# Patient Record
Sex: FEMALE | Race: WHITE | NOT HISPANIC OR LATINO
[De-identification: names, ages, dates, MRNs, and addresses within clinical notes are randomized per-mention and may not be internally consistent; named-entity substitution may affect disease eponyms.]

---

## 2017-01-25 ENCOUNTER — RESULT REVIEW (OUTPATIENT)
Age: 22
End: 2017-01-25

## 2017-03-01 ENCOUNTER — APPOINTMENT (OUTPATIENT)
Dept: ORTHOPEDIC SURGERY | Facility: CLINIC | Age: 22
End: 2017-03-01

## 2017-03-22 ENCOUNTER — APPOINTMENT (OUTPATIENT)
Dept: ORTHOPEDIC SURGERY | Facility: CLINIC | Age: 22
End: 2017-03-22

## 2018-03-15 ENCOUNTER — RESULT REVIEW (OUTPATIENT)
Age: 23
End: 2018-03-15

## 2018-06-17 ENCOUNTER — TRANSCRIPTION ENCOUNTER (OUTPATIENT)
Age: 23
End: 2018-06-17

## 2018-06-17 ENCOUNTER — INPATIENT (INPATIENT)
Facility: HOSPITAL | Age: 23
LOS: 0 days | Discharge: HOME | DRG: 342 | End: 2018-06-18
Attending: SURGERY | Admitting: SURGERY
Payer: COMMERCIAL

## 2018-06-17 VITALS
HEIGHT: 64 IN | WEIGHT: 240.08 LBS | RESPIRATION RATE: 18 BRPM | DIASTOLIC BLOOD PRESSURE: 84 MMHG | TEMPERATURE: 100 F | OXYGEN SATURATION: 100 % | HEART RATE: 99 BPM | SYSTOLIC BLOOD PRESSURE: 139 MMHG

## 2018-06-17 DIAGNOSIS — Z98.890 OTHER SPECIFIED POSTPROCEDURAL STATES: Chronic | ICD-10-CM

## 2018-06-17 LAB
APPEARANCE UR: CLEAR — SIGNIFICANT CHANGE UP
BACTERIA # UR AUTO: ABNORMAL /HPF
BASOPHILS # BLD AUTO: 0.1 K/UL — SIGNIFICANT CHANGE UP (ref 0–0.2)
BASOPHILS NFR BLD AUTO: 0.3 % — SIGNIFICANT CHANGE UP (ref 0–2)
BILIRUB UR-MCNC: NEGATIVE — SIGNIFICANT CHANGE UP
COLOR SPEC: COLORLESS — SIGNIFICANT CHANGE UP
DIFF PNL FLD: NEGATIVE — SIGNIFICANT CHANGE UP
EOSINOPHIL # BLD AUTO: 0.2 K/UL — SIGNIFICANT CHANGE UP (ref 0–0.5)
EOSINOPHIL NFR BLD AUTO: 0.9 % — SIGNIFICANT CHANGE UP (ref 0–6)
EPI CELLS # UR: SIGNIFICANT CHANGE UP /HPF
GLUCOSE UR QL: NEGATIVE — SIGNIFICANT CHANGE UP
HCT VFR BLD CALC: 45.4 % — HIGH (ref 34.5–45)
HGB BLD-MCNC: 14.6 G/DL — SIGNIFICANT CHANGE UP (ref 11.5–15.5)
KETONES UR-MCNC: NEGATIVE — SIGNIFICANT CHANGE UP
LEUKOCYTE ESTERASE UR-ACNC: ABNORMAL
LYMPHOCYTES # BLD AUTO: 15.4 % — SIGNIFICANT CHANGE UP (ref 13–44)
LYMPHOCYTES # BLD AUTO: 2.6 K/UL — SIGNIFICANT CHANGE UP (ref 1–3.3)
MCHC RBC-ENTMCNC: 26.1 PG — LOW (ref 27–34)
MCHC RBC-ENTMCNC: 32.1 GM/DL — SIGNIFICANT CHANGE UP (ref 32–36)
MCV RBC AUTO: 81.2 FL — SIGNIFICANT CHANGE UP (ref 80–100)
MONOCYTES # BLD AUTO: 0.9 K/UL — SIGNIFICANT CHANGE UP (ref 0–0.9)
MONOCYTES NFR BLD AUTO: 5.5 % — SIGNIFICANT CHANGE UP (ref 2–14)
NEUTROPHILS # BLD AUTO: 13.3 K/UL — HIGH (ref 1.8–7.4)
NEUTROPHILS NFR BLD AUTO: 77.8 % — HIGH (ref 43–77)
NITRITE UR-MCNC: NEGATIVE — SIGNIFICANT CHANGE UP
PH UR: 7 — SIGNIFICANT CHANGE UP (ref 5–8)
PLATELET # BLD AUTO: 362 K/UL — SIGNIFICANT CHANGE UP (ref 150–400)
PROT UR-MCNC: NEGATIVE — SIGNIFICANT CHANGE UP
RBC # BLD: 5.58 M/UL — HIGH (ref 3.8–5.2)
RBC # FLD: 12.1 % — SIGNIFICANT CHANGE UP (ref 10.3–14.5)
RBC CASTS # UR COMP ASSIST: SIGNIFICANT CHANGE UP /HPF (ref 0–2)
SP GR SPEC: <1.005 — LOW (ref 1.01–1.02)
UROBILINOGEN FLD QL: NEGATIVE — SIGNIFICANT CHANGE UP
WBC # BLD: 17 K/UL — HIGH (ref 3.8–10.5)
WBC # FLD AUTO: 17 K/UL — HIGH (ref 3.8–10.5)
WBC UR QL: SIGNIFICANT CHANGE UP /HPF (ref 0–5)

## 2018-06-17 PROCEDURE — 99285 EMERGENCY DEPT VISIT HI MDM: CPT | Mod: 25

## 2018-06-17 RX ORDER — SODIUM CHLORIDE 9 MG/ML
1000 INJECTION INTRAMUSCULAR; INTRAVENOUS; SUBCUTANEOUS ONCE
Qty: 0 | Refills: 0 | Status: COMPLETED | OUTPATIENT
Start: 2018-06-17 | End: 2018-06-17

## 2018-06-17 RX ORDER — ACETAMINOPHEN 500 MG
1000 TABLET ORAL ONCE
Qty: 0 | Refills: 0 | Status: COMPLETED | OUTPATIENT
Start: 2018-06-17 | End: 2018-06-17

## 2018-06-17 RX ORDER — ONDANSETRON 8 MG/1
4 TABLET, FILM COATED ORAL ONCE
Qty: 0 | Refills: 0 | Status: COMPLETED | OUTPATIENT
Start: 2018-06-17 | End: 2018-06-17

## 2018-06-17 RX ADMIN — ONDANSETRON 4 MILLIGRAM(S): 8 TABLET, FILM COATED ORAL at 23:19

## 2018-06-17 RX ADMIN — Medication 400 MILLIGRAM(S): at 23:19

## 2018-06-17 RX ADMIN — SODIUM CHLORIDE 2000 MILLILITER(S): 9 INJECTION INTRAMUSCULAR; INTRAVENOUS; SUBCUTANEOUS at 23:19

## 2018-06-17 NOTE — ED PROVIDER NOTE - ATTENDING CONTRIBUTION TO CARE
Nani Yap MD - Attending Physician: I have personally seen and examined this patient with the resident/fellow.  I have fully participated in the care of this patient. I have reviewed all pertinent clinical information, including history, physical exam, plan and the Resident/Fellow’s note and agree except as noted. See MDM

## 2018-06-17 NOTE — ED PROVIDER NOTE - PHYSICAL EXAMINATION
Soham: A & O x 3, NAD, HEENT WNL and no facial asymmetry; lungs CTAB, heart with reg rhythm; abdomen soft with diffuse abdominal pain worst in the right lower quadrant, pelvic exam with Vivek Quach (ed tech) with minimal pain, and physiologic discharge; extremities with no edema; skin with no rashes, neuro exam non focal with no motor or sensory deficits

## 2018-06-17 NOTE — ED PROVIDER NOTE - SHIFT CHANGE DETAILS
***ATTENDING ADDENDUM (Dr. Scot Garnica): I have received handoff from [facility] [place]; followup all ED diagnostics as ordered, and perform serial reassesments. Will continue to observe and monitor closely.

## 2018-06-17 NOTE — ED PROVIDER NOTE - MEDICAL DECISION MAKING DETAILS
Nani Yap MD - Attending Physician: Pt here with 1 day of abdominal pain. BLQ but tender in RLQ, mild diarrhea yesterday but now with nausea, no vomiting. Likely enteritis but given symptoms and location will check labs, US ovaries r/o torsion. D/w patient low possibility of appy given symptoms but tenderness concerning. Pt would like to avoid CT if possible. If pain not improved, leukocytosis, no ovarian pathology will check CT abd for r/o appy

## 2018-06-17 NOTE — ED PROVIDER NOTE - PROGRESS NOTE DETAILS
**ATTENDING ADDENDUM (Dr. Scot Garnica): patient serially evaluated throughout ED course by ED team since handoff. US as ordered by Fracisco non-diagnostic. Given findings on UA and US, agree with CT abdomen/pelvis to exclude acute appendicitis or equivalent pathology. NO evidence of torsion or cyst at this time based on findings of ED diagnostics. Endorsed to LARRY Quiles at time of handoff. ED team Will continue to observe and monitor closely. Soham PGY3: remains pain controlled at this time. awaiting ct results. Soham PGY3: ct showing early appendicitis. antibiotics ordered (pcn allergy hives) and surgery consulted. pt is npo. Attending Chloe Quiles: I received sign out. CT shows evidence of appendicitis, surgery consulted

## 2018-06-17 NOTE — ED PROVIDER NOTE - OBJECTIVE STATEMENT
22 year old, with lower abdominal pain worse on right, for 1 day. 3x diarrhea yesterday. new radiation to right flank. no dysuria. Subjective fever last night. feels well today. no recent travel, no sick contacts. LMP April and this is normal because of her daily OCP. Positive cramping in abdomen but does not feel like pelvic cramping.     PMD: none 22 year old, with lower abdominal pain worse on right, for 1 day. 3x diarrhea yesterday. new radiation to right flank. no dysuria. Subjective fever last night. feels well today. no recent travel, no sick contacts. LMP April and this is normal because of her daily OCP. Positive cramping in abdomen but does not feel like pelvic cramping.   PMD: none

## 2018-06-18 ENCOUNTER — RESULT REVIEW (OUTPATIENT)
Age: 23
End: 2018-06-18

## 2018-06-18 VITALS
OXYGEN SATURATION: 99 % | HEART RATE: 96 BPM | TEMPERATURE: 98 F | DIASTOLIC BLOOD PRESSURE: 60 MMHG | SYSTOLIC BLOOD PRESSURE: 122 MMHG | RESPIRATION RATE: 18 BRPM

## 2018-06-18 DIAGNOSIS — K37 UNSPECIFIED APPENDICITIS: ICD-10-CM

## 2018-06-18 LAB
ALBUMIN SERPL ELPH-MCNC: 4.5 G/DL — SIGNIFICANT CHANGE UP (ref 3.3–5)
ALP SERPL-CCNC: 132 U/L — HIGH (ref 40–120)
ALT FLD-CCNC: 16 U/L — SIGNIFICANT CHANGE UP (ref 10–45)
ANION GAP SERPL CALC-SCNC: 16 MMOL/L — SIGNIFICANT CHANGE UP (ref 5–17)
APTT BLD: 33.1 SEC — SIGNIFICANT CHANGE UP (ref 27.5–37.4)
AST SERPL-CCNC: 11 U/L — SIGNIFICANT CHANGE UP (ref 10–40)
BILIRUB SERPL-MCNC: 0.2 MG/DL — SIGNIFICANT CHANGE UP (ref 0.2–1.2)
BLD GP AB SCN SERPL QL: NEGATIVE — SIGNIFICANT CHANGE UP
BUN SERPL-MCNC: 6 MG/DL — LOW (ref 7–23)
C TRACH RRNA SPEC QL NAA+PROBE: SIGNIFICANT CHANGE UP
CALCIUM SERPL-MCNC: 9.5 MG/DL — SIGNIFICANT CHANGE UP (ref 8.4–10.5)
CHLORIDE SERPL-SCNC: 104 MMOL/L — SIGNIFICANT CHANGE UP (ref 96–108)
CO2 SERPL-SCNC: 22 MMOL/L — SIGNIFICANT CHANGE UP (ref 22–31)
CREAT SERPL-MCNC: 0.8 MG/DL — SIGNIFICANT CHANGE UP (ref 0.5–1.3)
GLUCOSE SERPL-MCNC: 92 MG/DL — SIGNIFICANT CHANGE UP (ref 70–99)
INR BLD: 1.02 RATIO — SIGNIFICANT CHANGE UP (ref 0.88–1.16)
LIDOCAIN IGE QN: 17 U/L — SIGNIFICANT CHANGE UP (ref 7–60)
N GONORRHOEA RRNA SPEC QL NAA+PROBE: SIGNIFICANT CHANGE UP
POTASSIUM SERPL-MCNC: 3.9 MMOL/L — SIGNIFICANT CHANGE UP (ref 3.5–5.3)
POTASSIUM SERPL-SCNC: 3.9 MMOL/L — SIGNIFICANT CHANGE UP (ref 3.5–5.3)
PROT SERPL-MCNC: 7.8 G/DL — SIGNIFICANT CHANGE UP (ref 6–8.3)
PROTHROM AB SERPL-ACNC: 11 SEC — SIGNIFICANT CHANGE UP (ref 9.8–12.7)
RH IG SCN BLD-IMP: POSITIVE — SIGNIFICANT CHANGE UP
SODIUM SERPL-SCNC: 142 MMOL/L — SIGNIFICANT CHANGE UP (ref 135–145)
SPECIMEN SOURCE: SIGNIFICANT CHANGE UP

## 2018-06-18 PROCEDURE — 96374 THER/PROPH/DIAG INJ IV PUSH: CPT | Mod: XU

## 2018-06-18 PROCEDURE — 76830 TRANSVAGINAL US NON-OB: CPT

## 2018-06-18 PROCEDURE — 87591 N.GONORRHOEAE DNA AMP PROB: CPT

## 2018-06-18 PROCEDURE — 85027 COMPLETE CBC AUTOMATED: CPT

## 2018-06-18 PROCEDURE — 86850 RBC ANTIBODY SCREEN: CPT

## 2018-06-18 PROCEDURE — 83690 ASSAY OF LIPASE: CPT

## 2018-06-18 PROCEDURE — 88304 TISSUE EXAM BY PATHOLOGIST: CPT | Mod: 26

## 2018-06-18 PROCEDURE — 88304 TISSUE EXAM BY PATHOLOGIST: CPT

## 2018-06-18 PROCEDURE — 93975 VASCULAR STUDY: CPT | Mod: 26

## 2018-06-18 PROCEDURE — 93975 VASCULAR STUDY: CPT

## 2018-06-18 PROCEDURE — 85730 THROMBOPLASTIN TIME PARTIAL: CPT

## 2018-06-18 PROCEDURE — 99222 1ST HOSP IP/OBS MODERATE 55: CPT | Mod: 57

## 2018-06-18 PROCEDURE — 87086 URINE CULTURE/COLONY COUNT: CPT

## 2018-06-18 PROCEDURE — 86901 BLOOD TYPING SEROLOGIC RH(D): CPT

## 2018-06-18 PROCEDURE — 80053 COMPREHEN METABOLIC PANEL: CPT

## 2018-06-18 PROCEDURE — 85610 PROTHROMBIN TIME: CPT

## 2018-06-18 PROCEDURE — 87491 CHLMYD TRACH DNA AMP PROBE: CPT

## 2018-06-18 PROCEDURE — 99285 EMERGENCY DEPT VISIT HI MDM: CPT | Mod: 25

## 2018-06-18 PROCEDURE — 86900 BLOOD TYPING SEROLOGIC ABO: CPT

## 2018-06-18 PROCEDURE — 76830 TRANSVAGINAL US NON-OB: CPT | Mod: 26

## 2018-06-18 PROCEDURE — 96375 TX/PRO/DX INJ NEW DRUG ADDON: CPT

## 2018-06-18 PROCEDURE — 74177 CT ABD & PELVIS W/CONTRAST: CPT

## 2018-06-18 PROCEDURE — 81001 URINALYSIS AUTO W/SCOPE: CPT

## 2018-06-18 PROCEDURE — 74177 CT ABD & PELVIS W/CONTRAST: CPT | Mod: 26

## 2018-06-18 PROCEDURE — 44970 LAPAROSCOPY APPENDECTOMY: CPT

## 2018-06-18 RX ORDER — LAMOTRIGINE 25 MG/1
1 TABLET, ORALLY DISINTEGRATING ORAL
Qty: 0 | Refills: 0 | COMMUNITY

## 2018-06-18 RX ORDER — METRONIDAZOLE 500 MG
500 TABLET ORAL EVERY 8 HOURS
Qty: 0 | Refills: 0 | Status: DISCONTINUED | OUTPATIENT
Start: 2018-06-18 | End: 2018-06-18

## 2018-06-18 RX ORDER — OXYCODONE AND ACETAMINOPHEN 5; 325 MG/1; MG/1
1 TABLET ORAL EVERY 6 HOURS
Qty: 0 | Refills: 0 | Status: DISCONTINUED | OUTPATIENT
Start: 2018-06-18 | End: 2018-06-18

## 2018-06-18 RX ORDER — HYDROMORPHONE HYDROCHLORIDE 2 MG/ML
0.5 INJECTION INTRAMUSCULAR; INTRAVENOUS; SUBCUTANEOUS
Qty: 0 | Refills: 0 | Status: DISCONTINUED | OUTPATIENT
Start: 2018-06-18 | End: 2018-06-18

## 2018-06-18 RX ORDER — KETOROLAC TROMETHAMINE 30 MG/ML
15 SYRINGE (ML) INJECTION EVERY 4 HOURS
Qty: 0 | Refills: 0 | Status: DISCONTINUED | OUTPATIENT
Start: 2018-06-18 | End: 2018-06-18

## 2018-06-18 RX ORDER — ACETAMINOPHEN 500 MG
650 TABLET ORAL EVERY 6 HOURS
Qty: 0 | Refills: 0 | Status: DISCONTINUED | OUTPATIENT
Start: 2018-06-18 | End: 2018-06-18

## 2018-06-18 RX ORDER — CIPROFLOXACIN LACTATE 400MG/40ML
400 VIAL (ML) INTRAVENOUS EVERY 12 HOURS
Qty: 0 | Refills: 0 | Status: DISCONTINUED | OUTPATIENT
Start: 2018-06-18 | End: 2018-06-18

## 2018-06-18 RX ORDER — ACETAMINOPHEN 500 MG
2 TABLET ORAL
Qty: 0 | Refills: 0 | COMMUNITY
Start: 2018-06-18

## 2018-06-18 RX ORDER — ACETAMINOPHEN 500 MG
1000 TABLET ORAL ONCE
Qty: 0 | Refills: 0 | Status: COMPLETED | OUTPATIENT
Start: 2018-06-18 | End: 2018-06-18

## 2018-06-18 RX ADMIN — Medication 100 MILLIGRAM(S): at 05:55

## 2018-06-18 RX ADMIN — Medication 1000 MILLIGRAM(S): at 07:45

## 2018-06-18 RX ADMIN — Medication 400 MILLIGRAM(S): at 05:51

## 2018-06-18 RX ADMIN — Medication 15 MILLIGRAM(S): at 14:12

## 2018-06-18 RX ADMIN — Medication 1000 MILLIGRAM(S): at 04:03

## 2018-06-18 RX ADMIN — Medication 15 MILLIGRAM(S): at 13:42

## 2018-06-18 RX ADMIN — Medication 200 MILLIGRAM(S): at 04:13

## 2018-06-18 NOTE — H&P ADULT - HISTORY OF PRESENT ILLNESS
22F w/ PSH of elbow surgery who presents with just over one day of abdominal pain and nausea without emesis. The pain is mostly in the RLQ. She has not had pain like this before. Nothing in particular brought it on. She was out to dinner Saturday 6/16 and it came on suddenly. Last PO was yesterday 1PM. She denies diarrhea. She does not have an appetite.   CT in the ED revealed early uncomplicated appendicitis.  ICU Vital Signs Last 24 Hrs  T(C): 37 (18 Jun 2018 04:20), Max: 37.7 (17 Jun 2018 22:01)  T(F): 98.6 (18 Jun 2018 04:20), Max: 99.8 (17 Jun 2018 22:01)  HR: 92 (18 Jun 2018 04:20) (86 - 99)  BP: 129/73 (18 Jun 2018 04:20) (114/69 - 139/84)  BP(mean): --  ABP: --  ABP(mean): --  RR: 16 (18 Jun 2018 04:20) (16 - 18)  SpO2: 100% (18 Jun 2018 04:20) (100% - 100%)

## 2018-06-18 NOTE — ASU DISCHARGE PLAN (ADULT/PEDIATRIC). - MEDICATION SUMMARY - MEDICATIONS TO TAKE
I will START or STAY ON the medications listed below when I get home from the hospital:    acetaminophen 325 mg oral tablet  -- 2 tab(s) by mouth every 6 hours, As needed, Mild Pain (1 - 3)  -- Indication: For Pain    oxyCODONE-acetaminophen 5 mg-325 mg oral tablet  -- 1 tab(s) by mouth every 6 hours, As needed, Moderate Pain (4 - 6) MDD:4 tabs  -- Indication: For Pain    lamoTRIgine 100 mg oral tablet  -- 1 tab(s) by mouth 2 times a day  -- Indication: For Anxiety

## 2018-06-18 NOTE — ED ADULT NURSE REASSESSMENT NOTE - NS ED NURSE REASSESS COMMENT FT1
Pt complaining of 5/10 abdominal pain at this time. MD Rousseau aware and pt to receive 1 g of IV acetaminophen

## 2018-06-18 NOTE — ED ADULT NURSE REASSESSMENT NOTE - NS ED NURSE REASSESS COMMENT FT1
MD Judge from Surgery @ bedside evaluating pt at this time. MD Ford from Surgery @ bedside evaluating pt at this time.

## 2018-06-18 NOTE — ED ADULT NURSE REASSESSMENT NOTE - NS ED NURSE REASSESS COMMENT FT1
report given to OR nurse Elena, parents made aware. MD Dumont at bedside discussing plan with family and patient.

## 2018-06-18 NOTE — ED ADULT NURSE REASSESSMENT NOTE - NS ED NURSE REASSESS COMMENT FT1
04:20. VSS. Pt AAOx4, NAD, resp nonlabored, resting comfortably in bed with family at bedside. Pt denies headache, dizziness, chest pain, palpitations, SOB, abd pain, n/v/d, urinary symptoms, fevers, chills, weakness at this time. Pt awaiting Surgery consult. Safety maintained.

## 2018-06-18 NOTE — ED ADULT NURSE REASSESSMENT NOTE - NS ED NURSE REASSESS COMMENT FT1
Received report from Devora BOSS in pediatrics. Patient admitted to the hospital for appendicitis. Pt pending consult from surgery team, was told OR today for appendectomy. pt denies pain at this time while laying down, states it worsens when she moves around. no vomiting/nausea. vital signs stable, pt fully undressed for OR, unable to remove one earing in ear. belongings given to parents.

## 2018-06-18 NOTE — H&P ADULT - NSHPPHYSICALEXAM_GEN_ALL_CORE
NAD, awake and alert  No jaundice or scleral icterus  Respirations nonlabored  CV Regular  Abdomen soft, obese, tender RLQ, nondistended  Localized rebound  Extremities warm

## 2018-06-18 NOTE — BRIEF OPERATIVE NOTE - PROCEDURE
<<-----Click on this checkbox to enter Procedure Appendectomy  06/18/2018  Laparoscopic  Active  [id number]

## 2018-06-18 NOTE — H&P ADULT - ATTENDING COMMENTS
Patient seen and examined in ED  Signs / symptoms / imaging consistent with acute appendicitis  Risks / benefits / alternatives to laparoscopic, possible open, appendectomy discussed  All questions answered  Consent signed

## 2018-06-18 NOTE — H&P ADULT - ASSESSMENT
22F w/ acute appendicitis    - admit to [facility]  - NPO  - IV fluids  - Type and screen, coags  - Hcg negative  - IV Cipro/Flagyl  - VTE ppx  - added on and consented for lap appy. Risks and benefits explained  - d/w Dr. Mireya Ford MD  [phone]

## 2018-06-19 LAB
CULTURE RESULTS: SIGNIFICANT CHANGE UP
SPECIMEN SOURCE: SIGNIFICANT CHANGE UP

## 2018-06-23 LAB — SURGICAL PATHOLOGY STUDY: SIGNIFICANT CHANGE UP

## 2018-06-28 ENCOUNTER — APPOINTMENT (OUTPATIENT)
Dept: TRAUMA SURGERY | Facility: CLINIC | Age: 23
End: 2018-06-28
Payer: COMMERCIAL

## 2018-06-28 VITALS
TEMPERATURE: 98.3 F | SYSTOLIC BLOOD PRESSURE: 109 MMHG | HEART RATE: 88 BPM | DIASTOLIC BLOOD PRESSURE: 70 MMHG | BODY MASS INDEX: 40.97 KG/M2 | HEIGHT: 64 IN | WEIGHT: 240 LBS

## 2018-06-28 PROCEDURE — 99024 POSTOP FOLLOW-UP VISIT: CPT

## 2019-03-30 ENCOUNTER — LABORATORY RESULT (OUTPATIENT)
Age: 24
End: 2019-03-30

## 2019-04-01 ENCOUNTER — RESULT CHARGE (OUTPATIENT)
Age: 24
End: 2019-04-01

## 2019-04-01 ENCOUNTER — APPOINTMENT (OUTPATIENT)
Age: 24
End: 2019-04-01
Payer: COMMERCIAL

## 2019-04-01 ENCOUNTER — RECORD ABSTRACTING (OUTPATIENT)
Age: 24
End: 2019-04-01

## 2019-04-01 VITALS
SYSTOLIC BLOOD PRESSURE: 121 MMHG | HEIGHT: 64.5 IN | DIASTOLIC BLOOD PRESSURE: 82 MMHG | WEIGHT: 237.2 LBS | TEMPERATURE: 99.4 F | BODY MASS INDEX: 40 KG/M2

## 2019-04-01 DIAGNOSIS — Z87.09 PERSONAL HISTORY OF OTHER DISEASES OF THE RESPIRATORY SYSTEM: ICD-10-CM

## 2019-04-01 DIAGNOSIS — B09 UNSPECIFIED VIRAL INFECTION CHARACTERIZED BY SKIN AND MUCOUS MEMBRANE LESIONS: ICD-10-CM

## 2019-04-01 LAB — S PYO AG SPEC QL IA: NEGATIVE

## 2019-04-01 PROCEDURE — 87880 STREP A ASSAY W/OPTIC: CPT | Mod: QW

## 2019-04-01 PROCEDURE — 99213 OFFICE O/P EST LOW 20 MIN: CPT

## 2019-04-01 NOTE — PHYSICAL EXAM
[Erythematous Oropharynx] : erythematous oropharynx [Tender cervical lymph nodes] : tender cervical lymph nodes  [NL] : soft, non tender, non distended, normal bowel sounds, no hepatosplenomegaly [Erythematous] : erythematous [Maculopapular Eruption] : maculopapular eruption [Trunk] : trunk [Arms] : arms [Legs] : legs

## 2019-04-01 NOTE — HISTORY OF PRESENT ILLNESS
[Derm Symptoms] : DERM SYMPTOMS [Rash] : rash [Trunk] : trunk [___ Day(s)] : [unfilled] day(s) [Recent Antibiotic Use: ____] : recent antibiotic use: [unfilled] [Itchy] : itchy [Fever] : fever [Reducted Appetite] : no reduced appetite [URI Symptoms] : URI symptoms [Sore Throat] : sore throat [Vomiting] : no vomiting [Discharge from affected areas] : no discharge from affected areas [Pruritus] : pruritus [Diarrhea] : no diarrhea [Bleeding from affected areas] : no bleeding from affected areas [Max Temp: ____] : Max temperature: [unfilled] [de-identified] : rash [FreeTextEntry6] : Pt presents with rash x 1 day. Seen 2 days ago for sx of sore throat, swollen glands, fatigue. Started on azithromycin and sent for bloodwork. Pt still having sore throat and swollen glands; not with rash on body.\par

## 2019-04-04 LAB — BACTERIA THROAT CULT: NORMAL

## 2019-04-05 ENCOUNTER — APPOINTMENT (OUTPATIENT)
Age: 24
End: 2019-04-05
Payer: COMMERCIAL

## 2019-04-05 VITALS
WEIGHT: 14.81 LBS | HEIGHT: 64.5 IN | TEMPERATURE: 98.3 F | BODY MASS INDEX: 2.5 KG/M2 | DIASTOLIC BLOOD PRESSURE: 80 MMHG | SYSTOLIC BLOOD PRESSURE: 129 MMHG | HEART RATE: 99 BPM

## 2019-04-05 PROCEDURE — 99213 OFFICE O/P EST LOW 20 MIN: CPT

## 2019-04-05 RX ORDER — AZITHROMYCIN 250 MG/1
250 TABLET, FILM COATED ORAL
Qty: 6 | Refills: 0 | Status: DISCONTINUED | COMMUNITY
Start: 2019-03-30

## 2019-04-05 NOTE — DISCUSSION/SUMMARY
[FreeTextEntry1] : Pt presents as a follow up for rash and swollen cervical lymph nodes. Both have which improved. On day 5 of oral prednisone. Afebrile. Adenovirus titers +\par discussed reasons to return \par call as needed. \par

## 2019-04-13 ENCOUNTER — APPOINTMENT (OUTPATIENT)
Dept: PEDIATRICS | Facility: CLINIC | Age: 24
End: 2019-04-13
Payer: COMMERCIAL

## 2019-04-13 VITALS
TEMPERATURE: 100.1 F | HEIGHT: 64.5 IN | HEART RATE: 123 BPM | SYSTOLIC BLOOD PRESSURE: 122 MMHG | WEIGHT: 237.38 LBS | BODY MASS INDEX: 40.03 KG/M2 | DIASTOLIC BLOOD PRESSURE: 85 MMHG

## 2019-04-13 DIAGNOSIS — J06.9 ACUTE UPPER RESPIRATORY INFECTION, UNSPECIFIED: ICD-10-CM

## 2019-04-13 DIAGNOSIS — N39.0 URINARY TRACT INFECTION, SITE NOT SPECIFIED: ICD-10-CM

## 2019-04-13 LAB
FLUAV SPEC QL CULT: NEGATIVE
FLUBV AG SPEC QL IA: NEGATIVE

## 2019-04-13 PROCEDURE — 99214 OFFICE O/P EST MOD 30 MIN: CPT

## 2019-04-13 PROCEDURE — 87804 INFLUENZA ASSAY W/OPTIC: CPT | Mod: QW

## 2019-04-13 RX ORDER — BENZONATATE 200 MG/1
200 CAPSULE ORAL
Qty: 20 | Refills: 0 | Status: DISCONTINUED | COMMUNITY
Start: 2019-02-07

## 2019-04-13 RX ORDER — LAMOTRIGINE 250 MG/1
250 TABLET, EXTENDED RELEASE ORAL
Qty: 30 | Refills: 0 | Status: DISCONTINUED | COMMUNITY
Start: 2018-10-15

## 2019-04-13 RX ORDER — CARBAMAZEPINE 200 MG/1
200 CAPSULE, EXTENDED RELEASE ORAL
Qty: 60 | Refills: 0 | Status: DISCONTINUED | COMMUNITY
Start: 2019-03-14

## 2019-04-13 RX ORDER — PREDNISONE 20 MG/1
20 TABLET ORAL TWICE DAILY
Qty: 10 | Refills: 0 | Status: DISCONTINUED | COMMUNITY
Start: 2019-04-01 | End: 2019-04-13

## 2019-04-13 RX ORDER — PREDNISONE 50 MG/1
50 TABLET ORAL
Qty: 5 | Refills: 0 | Status: DISCONTINUED | COMMUNITY
Start: 2019-04-01 | End: 2019-04-13

## 2019-04-13 RX ORDER — LAMOTRIGINE 200 MG/1
200 TABLET ORAL
Refills: 0 | Status: DISCONTINUED | COMMUNITY

## 2019-04-13 NOTE — HISTORY OF PRESENT ILLNESS
[de-identified] : headache, neck hurts, cough, low grade fever [FreeTextEntry6] : TEMP/ACHY X 24 HRS

## 2019-04-13 NOTE — DISCUSSION/SUMMARY
[FreeTextEntry1] : MOTRIN PRN\par FLU NEGATIVE\par GOING TO [place] THIS WEEK- WILL SEND MED TO [facility] IF S/S WORSEN

## 2019-04-13 NOTE — PHYSICAL EXAM
[Clear TM bilaterally] : clear tympanic membranes bilaterally [Nonerythematous Oropharynx] : nonerythematous oropharynx [Nontender Cervical Lymph Nodes] : nontender cervical lymph nodes [Soft] : soft [NonTender] : non tender [No Abnormal Lymph Nodes Palpated] : no abnormal lymph nodes palpated [NL] : warm

## 2019-04-15 RX ORDER — CIPROFLOXACIN HYDROCHLORIDE 500 MG/1
500 TABLET, FILM COATED ORAL
Qty: 6 | Refills: 0 | Status: COMPLETED | COMMUNITY
Start: 2019-04-15 | End: 2019-04-18

## 2019-04-16 ENCOUNTER — APPOINTMENT (OUTPATIENT)
Dept: PEDIATRICS | Facility: CLINIC | Age: 24
End: 2019-04-16
Payer: COMMERCIAL

## 2019-04-16 VITALS — HEART RATE: 96 BPM | DIASTOLIC BLOOD PRESSURE: 85 MMHG | TEMPERATURE: 98.5 F | SYSTOLIC BLOOD PRESSURE: 122 MMHG

## 2019-04-16 PROCEDURE — 99214 OFFICE O/P EST MOD 30 MIN: CPT

## 2019-04-16 NOTE — DISCUSSION/SUMMARY
[FreeTextEntry1] : discussed differential- including cardiac, pulmonary and GI causes. mom made appt with cardiologist for today. d/c vaping. Continue ciprofloxacin. SOB- stable on exam today, normal BP if SOB persists will make appt with pulmonologist. discussed reasons to go to ER

## 2019-04-16 NOTE — HISTORY OF PRESENT ILLNESS
[de-identified] : FEVER 103F X 3 DAYS, DYSPNEA  X 3 DAYS.  [FreeTextEntry6] : here for recheck of cough and fever. flu was neg in office. She did  not start zithromax and did not feel any improvement with the inhaler.  She went to GYN yesterday, dx with UTI, prescribed ciprofloxacin, fevers resolved now. CXR neg.   c/o still with difficulty breathing and chest discomfort. no chest pain. denies any trouble swallowing her pills or choking sensation. she says she vapes pot heavily daily.

## 2019-08-28 ENCOUNTER — APPOINTMENT (OUTPATIENT)
Dept: BARIATRICS | Facility: CLINIC | Age: 24
End: 2019-08-28
Payer: COMMERCIAL

## 2019-08-28 VITALS
SYSTOLIC BLOOD PRESSURE: 120 MMHG | HEIGHT: 64.5 IN | TEMPERATURE: 97.7 F | BODY MASS INDEX: 42.06 KG/M2 | DIASTOLIC BLOOD PRESSURE: 84 MMHG | WEIGHT: 249.38 LBS | HEART RATE: 80 BPM | OXYGEN SATURATION: 97 %

## 2019-08-28 DIAGNOSIS — R12 HEARTBURN: ICD-10-CM

## 2019-08-28 DIAGNOSIS — R06.02 SHORTNESS OF BREATH: ICD-10-CM

## 2019-08-28 DIAGNOSIS — Z82.49 FAMILY HISTORY OF ISCHEMIC HEART DISEASE AND OTHER DISEASES OF THE CIRCULATORY SYSTEM: ICD-10-CM

## 2019-08-28 DIAGNOSIS — M25.473 EFFUSION, UNSPECIFIED ANKLE: ICD-10-CM

## 2019-08-28 DIAGNOSIS — Z83.3 FAMILY HISTORY OF DIABETES MELLITUS: ICD-10-CM

## 2019-08-28 DIAGNOSIS — M79.671 PAIN IN RIGHT FOOT: ICD-10-CM

## 2019-08-28 DIAGNOSIS — M79.672 PAIN IN RIGHT FOOT: ICD-10-CM

## 2019-08-28 PROCEDURE — 99204 OFFICE O/P NEW MOD 45 MIN: CPT

## 2019-08-28 NOTE — HISTORY OF PRESENT ILLNESS
[de-identified] : 24 year old female who comes with both of her parents for refractive morbid obesity\par reports painful periods for which on bcp \par also has been in therapy for mood and weight issues that have not been successful for weight loss\par also reports heart burn\par educated and very motivated to do well\par discussed in detail will require endoscopy and for probable vsg\par has refractive morbid obesity and multiple issues\par feel that surgery has best chance to resolve

## 2019-08-28 NOTE — ASSESSMENT
[FreeTextEntry1] : for lap vsg unless anything of significance on egd\par discussed with patient and parents for 40 minutes

## 2019-09-06 ENCOUNTER — TRANSCRIPTION ENCOUNTER (OUTPATIENT)
Age: 24
End: 2019-09-06

## 2019-11-30 ENCOUNTER — TRANSCRIPTION ENCOUNTER (OUTPATIENT)
Age: 24
End: 2019-11-30

## 2019-12-04 ENCOUNTER — TRANSCRIPTION ENCOUNTER (OUTPATIENT)
Age: 24
End: 2019-12-04

## 2020-01-10 ENCOUNTER — TRANSCRIPTION ENCOUNTER (OUTPATIENT)
Age: 25
End: 2020-01-10

## 2020-05-21 ENCOUNTER — TRANSCRIPTION ENCOUNTER (OUTPATIENT)
Age: 25
End: 2020-05-21

## 2020-12-21 PROBLEM — Z87.09 HISTORY OF SORE THROAT: Status: RESOLVED | Noted: 2019-04-01 | Resolved: 2020-12-21

## 2020-12-21 PROBLEM — N39.0 ACUTE UTI: Status: RESOLVED | Noted: 2019-04-15 | Resolved: 2020-12-21

## 2020-12-21 PROBLEM — J06.9 URI, ACUTE: Status: RESOLVED | Noted: 2019-04-15 | Resolved: 2020-12-21

## 2021-02-07 ENCOUNTER — EMERGENCY (EMERGENCY)
Facility: HOSPITAL | Age: 26
LOS: 1 days | Discharge: HOME | End: 2021-02-07
Attending: EMERGENCY MEDICINE
Payer: COMMERCIAL

## 2021-02-07 VITALS
HEIGHT: 64 IN | TEMPERATURE: 99 F | DIASTOLIC BLOOD PRESSURE: 82 MMHG | RESPIRATION RATE: 19 BRPM | HEART RATE: 139 BPM | SYSTOLIC BLOOD PRESSURE: 119 MMHG | WEIGHT: 134.92 LBS | OXYGEN SATURATION: 99 %

## 2021-02-07 DIAGNOSIS — Z98.890 OTHER SPECIFIED POSTPROCEDURAL STATES: Chronic | ICD-10-CM

## 2021-02-07 LAB
ALBUMIN SERPL ELPH-MCNC: 3.9 G/DL — SIGNIFICANT CHANGE UP (ref 3.3–5)
ALP SERPL-CCNC: 90 U/L — SIGNIFICANT CHANGE UP (ref 40–120)
ALT FLD-CCNC: 10 U/L — SIGNIFICANT CHANGE UP (ref 10–45)
ANION GAP SERPL CALC-SCNC: 14 MMOL/L — SIGNIFICANT CHANGE UP (ref 5–17)
APPEARANCE UR: CLEAR — SIGNIFICANT CHANGE UP
AST SERPL-CCNC: 14 U/L — SIGNIFICANT CHANGE UP (ref 10–40)
BACTERIA # UR AUTO: NEGATIVE — SIGNIFICANT CHANGE UP
BASOPHILS # BLD AUTO: 0.02 K/UL — SIGNIFICANT CHANGE UP (ref 0–0.2)
BASOPHILS NFR BLD AUTO: 0.1 % — SIGNIFICANT CHANGE UP (ref 0–2)
BILIRUB SERPL-MCNC: 0.3 MG/DL — SIGNIFICANT CHANGE UP (ref 0.2–1.2)
BILIRUB UR-MCNC: NEGATIVE — SIGNIFICANT CHANGE UP
BUN SERPL-MCNC: 6 MG/DL — LOW (ref 7–23)
CALCIUM SERPL-MCNC: 8.9 MG/DL — SIGNIFICANT CHANGE UP (ref 8.4–10.5)
CHLORIDE SERPL-SCNC: 103 MMOL/L — SIGNIFICANT CHANGE UP (ref 96–108)
CO2 SERPL-SCNC: 21 MMOL/L — LOW (ref 22–31)
COLOR SPEC: YELLOW — SIGNIFICANT CHANGE UP
COMMENT - URINE: SIGNIFICANT CHANGE UP
CREAT SERPL-MCNC: 0.68 MG/DL — SIGNIFICANT CHANGE UP (ref 0.5–1.3)
D DIMER BLD IA.RAPID-MCNC: 159 NG/ML DDU — SIGNIFICANT CHANGE UP
DIFF PNL FLD: NEGATIVE — SIGNIFICANT CHANGE UP
EOSINOPHIL # BLD AUTO: 0 K/UL — SIGNIFICANT CHANGE UP (ref 0–0.5)
EOSINOPHIL NFR BLD AUTO: 0 % — SIGNIFICANT CHANGE UP (ref 0–6)
EPI CELLS # UR: 8 /HPF — HIGH
ERYTHROCYTE [SEDIMENTATION RATE] IN BLOOD: 24 MM/HR — HIGH (ref 0–15)
GLUCOSE SERPL-MCNC: 97 MG/DL — SIGNIFICANT CHANGE UP (ref 70–99)
GLUCOSE UR QL: NEGATIVE — SIGNIFICANT CHANGE UP
HCT VFR BLD CALC: 39.4 % — SIGNIFICANT CHANGE UP (ref 34.5–45)
HGB BLD-MCNC: 12.8 G/DL — SIGNIFICANT CHANGE UP (ref 11.5–15.5)
HYALINE CASTS # UR AUTO: 7 /LPF — HIGH (ref 0–2)
IMM GRANULOCYTES NFR BLD AUTO: 0.5 % — SIGNIFICANT CHANGE UP (ref 0–1.5)
KETONES UR-MCNC: ABNORMAL
LEUKOCYTE ESTERASE UR-ACNC: NEGATIVE — SIGNIFICANT CHANGE UP
LYMPHOCYTES # BLD AUTO: 1.52 K/UL — SIGNIFICANT CHANGE UP (ref 1–3.3)
LYMPHOCYTES # BLD AUTO: 9.4 % — LOW (ref 13–44)
MCHC RBC-ENTMCNC: 27.8 PG — SIGNIFICANT CHANGE UP (ref 27–34)
MCHC RBC-ENTMCNC: 32.5 GM/DL — SIGNIFICANT CHANGE UP (ref 32–36)
MCV RBC AUTO: 85.5 FL — SIGNIFICANT CHANGE UP (ref 80–100)
MONOCYTES # BLD AUTO: 1.17 K/UL — HIGH (ref 0–0.9)
MONOCYTES NFR BLD AUTO: 7.2 % — SIGNIFICANT CHANGE UP (ref 2–14)
NEUTROPHILS # BLD AUTO: 13.4 K/UL — HIGH (ref 1.8–7.4)
NEUTROPHILS NFR BLD AUTO: 82.8 % — HIGH (ref 43–77)
NITRITE UR-MCNC: NEGATIVE — SIGNIFICANT CHANGE UP
NRBC # BLD: 0 /100 WBCS — SIGNIFICANT CHANGE UP (ref 0–0)
PH UR: 6.5 — SIGNIFICANT CHANGE UP (ref 5–8)
PLATELET # BLD AUTO: 205 K/UL — SIGNIFICANT CHANGE UP (ref 150–400)
POTASSIUM SERPL-MCNC: 3.5 MMOL/L — SIGNIFICANT CHANGE UP (ref 3.5–5.3)
POTASSIUM SERPL-SCNC: 3.5 MMOL/L — SIGNIFICANT CHANGE UP (ref 3.5–5.3)
PROT SERPL-MCNC: 6.5 G/DL — SIGNIFICANT CHANGE UP (ref 6–8.3)
PROT UR-MCNC: ABNORMAL
RBC # BLD: 4.61 M/UL — SIGNIFICANT CHANGE UP (ref 3.8–5.2)
RBC # FLD: 13 % — SIGNIFICANT CHANGE UP (ref 10.3–14.5)
RBC CASTS # UR COMP ASSIST: 1 /HPF — SIGNIFICANT CHANGE UP (ref 0–4)
S PYO AG SPEC QL IA: NEGATIVE — SIGNIFICANT CHANGE UP
SARS-COV-2 RNA SPEC QL NAA+PROBE: SIGNIFICANT CHANGE UP
SODIUM SERPL-SCNC: 138 MMOL/L — SIGNIFICANT CHANGE UP (ref 135–145)
SP GR SPEC: 1.03 — HIGH (ref 1.01–1.02)
TSH SERPL-MCNC: 1.17 UIU/ML — SIGNIFICANT CHANGE UP (ref 0.27–4.2)
UROBILINOGEN FLD QL: ABNORMAL
WBC # BLD: 16.19 K/UL — HIGH (ref 3.8–10.5)
WBC # FLD AUTO: 16.19 K/UL — HIGH (ref 3.8–10.5)
WBC UR QL: 4 /HPF — SIGNIFICANT CHANGE UP (ref 0–5)

## 2021-02-07 PROCEDURE — 99218: CPT

## 2021-02-07 PROCEDURE — 93010 ELECTROCARDIOGRAM REPORT: CPT

## 2021-02-07 RX ORDER — SODIUM CHLORIDE 9 MG/ML
1000 INJECTION INTRAMUSCULAR; INTRAVENOUS; SUBCUTANEOUS ONCE
Refills: 0 | Status: COMPLETED | OUTPATIENT
Start: 2021-02-07 | End: 2021-02-07

## 2021-02-07 RX ORDER — CEFTRIAXONE 500 MG/1
1000 INJECTION, POWDER, FOR SOLUTION INTRAMUSCULAR; INTRAVENOUS ONCE
Refills: 0 | Status: DISCONTINUED | OUTPATIENT
Start: 2021-02-07 | End: 2021-02-07

## 2021-02-07 RX ORDER — KETOROLAC TROMETHAMINE 30 MG/ML
15 SYRINGE (ML) INJECTION ONCE
Refills: 0 | Status: DISCONTINUED | OUTPATIENT
Start: 2021-02-07 | End: 2021-02-07

## 2021-02-07 RX ORDER — SODIUM CHLORIDE 9 MG/ML
1000 INJECTION INTRAMUSCULAR; INTRAVENOUS; SUBCUTANEOUS
Refills: 0 | Status: DISCONTINUED | OUTPATIENT
Start: 2021-02-07 | End: 2021-02-10

## 2021-02-07 RX ORDER — IBUPROFEN 200 MG
600 TABLET ORAL ONCE
Refills: 0 | Status: DISCONTINUED | OUTPATIENT
Start: 2021-02-07 | End: 2021-02-07

## 2021-02-07 RX ORDER — MORPHINE SULFATE 50 MG/1
2 CAPSULE, EXTENDED RELEASE ORAL ONCE
Refills: 0 | Status: DISCONTINUED | OUTPATIENT
Start: 2021-02-07 | End: 2021-02-07

## 2021-02-07 RX ORDER — DEXAMETHASONE 0.5 MG/5ML
10 ELIXIR ORAL ONCE
Refills: 0 | Status: COMPLETED | OUTPATIENT
Start: 2021-02-07 | End: 2021-02-07

## 2021-02-07 RX ORDER — ONDANSETRON 8 MG/1
4 TABLET, FILM COATED ORAL ONCE
Refills: 0 | Status: COMPLETED | OUTPATIENT
Start: 2021-02-07 | End: 2021-02-07

## 2021-02-07 RX ORDER — ACETAMINOPHEN 500 MG
975 TABLET ORAL ONCE
Refills: 0 | Status: COMPLETED | OUTPATIENT
Start: 2021-02-07 | End: 2021-02-07

## 2021-02-07 RX ORDER — DEXAMETHASONE 0.5 MG/5ML
10 ELIXIR ORAL ONCE
Refills: 0 | Status: DISCONTINUED | OUTPATIENT
Start: 2021-02-07 | End: 2021-02-07

## 2021-02-07 RX ORDER — ACETAMINOPHEN 500 MG
1000 TABLET ORAL ONCE
Refills: 0 | Status: COMPLETED | OUTPATIENT
Start: 2021-02-07 | End: 2021-02-08

## 2021-02-07 RX ORDER — AZITHROMYCIN 500 MG/1
500 TABLET, FILM COATED ORAL ONCE
Refills: 0 | Status: DISCONTINUED | OUTPATIENT
Start: 2021-02-07 | End: 2021-02-07

## 2021-02-07 RX ADMIN — SODIUM CHLORIDE 1000 MILLILITER(S): 9 INJECTION INTRAMUSCULAR; INTRAVENOUS; SUBCUTANEOUS at 12:16

## 2021-02-07 RX ADMIN — MORPHINE SULFATE 2 MILLIGRAM(S): 50 CAPSULE, EXTENDED RELEASE ORAL at 18:22

## 2021-02-07 RX ADMIN — Medication 100 MILLIGRAM(S): at 17:56

## 2021-02-07 RX ADMIN — ONDANSETRON 4 MILLIGRAM(S): 8 TABLET, FILM COATED ORAL at 18:22

## 2021-02-07 RX ADMIN — SODIUM CHLORIDE 150 MILLILITER(S): 9 INJECTION INTRAMUSCULAR; INTRAVENOUS; SUBCUTANEOUS at 17:56

## 2021-02-07 RX ADMIN — SODIUM CHLORIDE 1000 MILLILITER(S): 9 INJECTION INTRAMUSCULAR; INTRAVENOUS; SUBCUTANEOUS at 14:10

## 2021-02-07 RX ADMIN — Medication 975 MILLIGRAM(S): at 15:27

## 2021-02-07 RX ADMIN — Medication 10 MILLIGRAM(S): at 15:28

## 2021-02-07 RX ADMIN — Medication 100 MILLIGRAM(S): at 15:28

## 2021-02-07 NOTE — ED PROVIDER NOTE - PHYSICAL EXAMINATION
CONSTITUTIONAL: NAD. Awake and conversive, cooperative with exam  EYES: EOMI, conjunctiva and sclera clear  ENMT: MMM, pharyngeal injection with BL yellow/white exudates. Cervical lymphadenopathy and TTP   NECK: Supple, posterior pharyngeal exudates bilaterally   RESPIRATORY: Normal respiratory effort, CTAB, no wheezes, rales, or rhonchi  CARDIOVASCULAR: RRR, normal S1 and S2, no MRG, no peripheral edema  ABDOMEN: Soft, non-distended, no TTP, no rebound/guarding  MUSCULOSKELETAL: No joint swelling or tenderness  NEURO: following commands, moving all extremities spontaneously  PSYCH: appropriate affect   SKIN: No rashes; no palpable lesions

## 2021-02-07 NOTE — ED CDU PROVIDER INITIAL DAY NOTE - ATTENDING CONTRIBUTION TO CARE
Attending MD Patel:   I personally have seen and examined this patient.  Physician assistant note reviewed and agree on plan of care and except where noted.  See below for details.     seen in [facility]    25F with PMH/PSH including anxiety, s/p gastric bypass (2020), on OCP presents to the ED with throat pain.  Reports started a week ago and worsened yesterday, now with fever.  Reports went to Urgent Care earlier today and reports negative rapid strep and monospot, reports sent in because of tachycardia.  Denies chest pain, shortness of breath, palpitations. Denies abdominal pain, nausea, vomiting, diarrhea, blood in stools. Denies dysuria, hematuria, change in urinary habits including frequency, urgency. Reports sexually active with two new male partners, denies vaginal discharge, genital lesions, dyspareunia.  Denies history of STI.  A ten (10) point review of systems was negative other than as stated in the HPI or elsewhere in the chart.     Exam:   General: NAD  HENT: head NCAT, airway patent with moist mucous membranes, ++pharyngitis and exudates on tonsils, uvula midline, mild erythema in bilateral canals, TMs intact, +cervical LAD  Eyes: PERRL, no injection  Lungs: lungs CTAB with good inspiratory effort, no wheezing, no rhonchi, no rales  Cardiac: +S1S2, +tachycardia, no m/r/g  GI: abdomen soft with +BS, NT, ND  : no CVAT  MSK: FROM at neck, no tenderness to midline palpation, no calf tenderness, swelling, erythema or warmth  Neuro: moving all extremities with 5/5 strength bilateral upper and lower extremities, sensory grossly intact, no gross neuro deficits  Psych: normal mood and affect   Skin: no rash    A/P: 25F with pharyngitis and exudates, Centor Criteria 4, will obtain labs, give IVFs, will send rapid strep and reflex cx, will also send EBV serum, will defer Tylenol given recent dose, will give decadron, CDU for frequent reassessment, IVFs, pain control

## 2021-02-07 NOTE — ED CDU PROVIDER INITIAL DAY NOTE - DETAILS
26 y/o F p/w fever, sore throat, pharyngitis  - continuous monitoring and frequent reevaluations   - continuous IV fluid hydration   - pain control and antipyretics PRN   - clindamycin 600mg Q6H   - repeat steroids PRN   - d/w ED attending

## 2021-02-07 NOTE — ED ADULT NURSE NOTE - OBJECTIVE STATEMENT
25 year old A&Ox3 female presents to ED with steady coordinated gait c/o sore throat and fevers x 2 days. PMH anxiety, PSH gastric bypass. Pt originally seen at  Urgent care and was instructed to come to ED bc pt heart rate was 140bpm, pt was also febrile. +erythematous and + exudate noted in posterior pharynx, airway intact. Arrives to ED with HR in 130s, EKG done, Amanda HERNANDEZ aware. Denies CP, SOB, n/v/d,  abdominal pain, urinary symptoms, weakness, fatigue, numbness, tingling in upper and lower extremities, HA, blurry vision. Updated on plan of care.

## 2021-02-07 NOTE — ED PROVIDER NOTE - ATTENDING CONTRIBUTION TO CARE
Attending MD Patel: I personally have seen and examined this patient.  Resident note reviewed and agree on plan of care and except where noted.  See below for details.     seen in [facility]    25F with PMH/PSH including anxiety, s/p gastric bypass (2020), on OCP presents to the ED with throat pain.  Reports started a week ago and worsened yesterday, now with fever.  Reports went to Urgent Care earlier today and reports negative rapid strep and monospot, reports sent in because of tachycardia.  Denies chest pain, shortness of breath, palpitations. Denies abdominal pain, nausea, vomiting, diarrhea, blood in stools. Denies dysuria, hematuria, change in urinary habits including frequency, urgency. Reports sexually active with two new male partners, denies vaginal discharge, genital lesions, dyspareunia.  Denies history of STI.  A ten (10) point review of systems was negative other than as stated in the HPI or elsewhere in the chart.     Exam:   General: NAD  HENT: head NCAT, airway patent with moist mucous membranes, ++pharyngitis and exudates on tonsils, uvula midline, mild erythema in bilateral canals, TMs intact, +cervical LAD  Eyes: PERRL, no injection  Lungs: lungs CTAB with good inspiratory effort, no wheezing, no rhonchi, no rales  Cardiac: +S1S2, +tachycardia, no m/r/g  GI: abdomen soft with +BS, NT, ND  : no CVAT  MSK: FROM at neck, no tenderness to midline palpation, no calf tenderness, swelling, erythema or warmth  Neuro: moving all extremities with 5/5 strength bilateral upper and lower extremities, sensory grossly intact, no gross neuro deficits  Psych: normal mood and affect   Skin: no rash    A/P: 25F with pharyngitis and exudates, Centor Criteria 4, will obtain labs, give IVFs, will send rapid strep and reflex cx, will also send EBV serum, will defer Tylenol given recent dose, will give decadron, will reassess

## 2021-02-07 NOTE — ED ADULT TRIAGE NOTE - CHIEF COMPLAINT QUOTE
sore throat and fever this AM. Strep negative at Urgent Care. sore throat and fever this AM. Strep negative at Urgent Care. Pt denies chest pain.

## 2021-02-07 NOTE — ED PROVIDER NOTE - NS ED ROS FT
Gen: No chills, night sweats. +Fevers, +decreased appetite  Eyes: No changes in vision   ENT: +congestion, +sore throat  Resp: No cough or trouble breathing  Cardiovascular: No chest pain or palpitation  Gastroenteric: No nausea, vomiting, diarrhea or constipation  :  No change in urine output, dysuria or hematuria   MS: No joint or muscle pain  Skin: No rashes, lacerations, wounds or abrasions   Neuro: No headache; no abnormal movements

## 2021-02-07 NOTE — ED CDU PROVIDER INITIAL DAY NOTE - OBJECTIVE STATEMENT
24yo F PMHx gastric bypass surgery, anxiety, on OCPs presents with weakness, sore throat x1 week and fever this AM. Strep and monospot negative at Urgent Care earlier today. Denies chest pain, shortness of breath, palpitations. Denies FHx thyroid disease. No N/V/D. Received 2 tablets of unknown dosage of Tylenol at  at 9:30am this morning. Sent from  secondary to tachycardia 140s unrelieved by Tylenol.  In the ED patient received 2L IV fluid hydration, pain control and antipyretics, IV clindamycin and sent to the CDU for continued monitoring and treatment

## 2021-02-07 NOTE — ED CDU PROVIDER INITIAL DAY NOTE - PROGRESS NOTE DETAILS
CDU PROGRESS NOTE ANURAG SAMUEL: Received pt at 1900 sign-out. Case/plan reviewed. VSS. Exam +  pharyngeal injection with BL white exudates. Cervical lymphadenopathy Pt reports having 5/10 throat discomfort, but declines analgesics now. Will continue to monitor. CDU PROGRESS NOTE PA LIZZIE: Pt resting comfortably, NAD, VSS. No interval change from prior exam, pt reports feeling better. No drooling, muffled voice, trismus or signs of respiratory distress. Will continue to monitor.

## 2021-02-07 NOTE — ED PROVIDER NOTE - CLINICAL SUMMARY MEDICAL DECISION MAKING FREE TEXT BOX
26yo F PMHx gastric bypass surgery, anxiety, on OCPs presents with sore throat x1 week and fever this AM. Will obtain basic blood work, TSH, EBV, GAS, COVID, give IVF and reassess.

## 2021-02-07 NOTE — ED PROVIDER NOTE - PROGRESS NOTE DETAILS
Attending MD Patel: Spoke with mother who reports both she and patient's sister had strep which did not respond to Azithromycin.  Reports that they were given Clindamycin with resolution.  Reports that they are all also PCN allergic.

## 2021-02-07 NOTE — ED PROVIDER NOTE - OBJECTIVE STATEMENT
24yo F PMHx gastric bypass surgery, anxiety, on OCPs presents with sore throat x1 week and fever this AM. Strep and monospot negative at Urgent Care earlier today. Denies chest pain, shortness of breath, palpitations. Denies FHx thyroid disease. No N/V/D. Received 2 tablets of unknown dosage of Tylenol at  at 9:30am this morning. Sent from  secondary to tachycardia 140s unrelieved by Tylenol. 24yo F PMHx gastric bypass surgery, anxiety, on OCPs presents with weakness, sore throat x1 week and fever this AM. Strep and monospot negative at Urgent Care earlier today. Denies chest pain, shortness of breath, palpitations. Denies FHx thyroid disease. No N/V/D. Received 2 tablets of unknown dosage of Tylenol at  at 9:30am this morning. Sent from  secondary to tachycardia 140s unrelieved by Tylenol.

## 2021-02-08 VITALS
OXYGEN SATURATION: 99 % | DIASTOLIC BLOOD PRESSURE: 69 MMHG | RESPIRATION RATE: 18 BRPM | TEMPERATURE: 98 F | SYSTOLIC BLOOD PRESSURE: 106 MMHG | HEART RATE: 65 BPM

## 2021-02-08 LAB
CULTURE RESULTS: SIGNIFICANT CHANGE UP
SPECIMEN SOURCE: SIGNIFICANT CHANGE UP

## 2021-02-08 PROCEDURE — 93005 ELECTROCARDIOGRAM TRACING: CPT

## 2021-02-08 PROCEDURE — 86664 EPSTEIN-BARR NUCLEAR ANTIGEN: CPT

## 2021-02-08 PROCEDURE — 87880 STREP A ASSAY W/OPTIC: CPT

## 2021-02-08 PROCEDURE — 86663 EPSTEIN-BARR ANTIBODY: CPT

## 2021-02-08 PROCEDURE — 96374 THER/PROPH/DIAG INJ IV PUSH: CPT

## 2021-02-08 PROCEDURE — 85652 RBC SED RATE AUTOMATED: CPT

## 2021-02-08 PROCEDURE — G0378: CPT

## 2021-02-08 PROCEDURE — 86665 EPSTEIN-BARR CAPSID VCA: CPT

## 2021-02-08 PROCEDURE — 87086 URINE CULTURE/COLONY COUNT: CPT

## 2021-02-08 PROCEDURE — 86140 C-REACTIVE PROTEIN: CPT

## 2021-02-08 PROCEDURE — U0005: CPT

## 2021-02-08 PROCEDURE — 84443 ASSAY THYROID STIM HORMONE: CPT

## 2021-02-08 PROCEDURE — 96376 TX/PRO/DX INJ SAME DRUG ADON: CPT

## 2021-02-08 PROCEDURE — 87081 CULTURE SCREEN ONLY: CPT

## 2021-02-08 PROCEDURE — 96375 TX/PRO/DX INJ NEW DRUG ADDON: CPT

## 2021-02-08 PROCEDURE — 99284 EMERGENCY DEPT VISIT MOD MDM: CPT | Mod: 25

## 2021-02-08 PROCEDURE — 81001 URINALYSIS AUTO W/SCOPE: CPT

## 2021-02-08 PROCEDURE — 85379 FIBRIN DEGRADATION QUANT: CPT

## 2021-02-08 PROCEDURE — 80053 COMPREHEN METABOLIC PANEL: CPT

## 2021-02-08 PROCEDURE — U0003: CPT

## 2021-02-08 PROCEDURE — 99217: CPT

## 2021-02-08 PROCEDURE — 85025 COMPLETE CBC W/AUTO DIFF WBC: CPT

## 2021-02-08 PROCEDURE — 84702 CHORIONIC GONADOTROPIN TEST: CPT

## 2021-02-08 RX ADMIN — Medication 400 MILLIGRAM(S): at 08:05

## 2021-02-08 RX ADMIN — Medication 100 MILLIGRAM(S): at 02:03

## 2021-02-08 RX ADMIN — SODIUM CHLORIDE 150 MILLILITER(S): 9 INJECTION INTRAMUSCULAR; INTRAVENOUS; SUBCUTANEOUS at 01:16

## 2021-02-08 RX ADMIN — Medication 100 MILLIGRAM(S): at 09:46

## 2021-02-08 NOTE — ED CDU PROVIDER SUBSEQUENT DAY NOTE - ATTENDING CONTRIBUTION TO CARE
ED attending Dr Abdiaziz Wolf note:  Patient re-evaluated and doing well.  No acute issues at  this time.  Lab and radiology tests reviewed with patient and/or family.  Patient stable for discharge.  I have personally performed a face to face diagnostic evaluation on this patient.  I have reviewed the ACP note and agree with the history, exam, and plan of care, except as noted.  History and Exam by me showed improvement of sts tolerating po, received 2L IV fluid hydration, pain control and antipyretics, IV clindamycin to d.c home has po clinda rx.

## 2021-02-08 NOTE — ED CDU PROVIDER DISPOSITION NOTE - CLINICAL COURSE
26yo F PMHx gastric bypass surgery, anxiety, on OCPs presents with weakness, sore throat x1 week and fever this AM. Strep and monospot negative at Urgent Care earlier today. Denies chest pain, shortness of breath, palpitations. Denies FHx thyroid disease. No N/V/D. Received 2 tablets of unknown dosage of Tylenol at  at 9:30am this morning. Sent from  secondary to tachycardia 140s unrelieved by Tylenol.  In the ED patient received 2L IV fluid hydration, pain control and antipyretics, IV clindamycin and sent to the CDU for continued monitoring and treatment 24yo F PMHx gastric bypass surgery, anxiety, on OCPs presents with weakness, sore throat x1 week and fever this AM. Strep and monospot negative at Urgent Care earlier today. Denies chest pain, shortness of breath, palpitations. Denies FHx thyroid disease. No N/V/D. Received 2 tablets of unknown dosage of Tylenol at  at 9:30am this morning. Sent from  secondary to tachycardia 140s unrelieved by Tylenol.  In the ED patient received 2L IV fluid hydration, pain control and antipyretics, IV clindamycin and sent to the CDU for continued monitoring and treatment. Patient is resting comfortably and is without any complaints. Reports improvement of throat pain. On exam slight tonsilar swelling b/l w/ exudates most notable on right. No drooling, muffled voice, trismus or signs of respiratory distress.  Pt tolerated PO, was seen by Dr. Wolf in am who agreed to d/c home

## 2021-02-08 NOTE — ED CDU PROVIDER SUBSEQUENT DAY NOTE - PROGRESS NOTE DETAILS
CDU PROGRESS NOTE PA LIZZIE: Pt resting comfortably, NAD, Exam unchanged from prior. No drooling, muffled voice, trismus or signs of respiratory distress. Will continue to monitor. CDU NOTE ANURAG Garcia: VSS NAD. Patient is resting comfortably and is without any complaints. Reports improvement of throat pain. On exam slight tonsilar swelling b/l w/ exudates most notable on right. No drooling, muffled voice, trismus or signs of respiratory distress.  Will trial PO with breakfast this am and if tolerates d/c home w/ oral abx after discussion w/ am CDU attending Pt seen by Dr. Wolf who agreed w/ d/c home. Pt receiving Clinda now, will d/c once complete   Alexa Garcia PA-C

## 2021-02-08 NOTE — ED CDU PROVIDER SUBSEQUENT DAY NOTE - HISTORY
CDU PROGRESS NOTE PA LIZZIE: Pt resting comfortably, NAD, VSS. Exam (+) pharyngeal injection with BL yellow/white exudates. Cervical lymphadenopathy and TTP. No drooling, muffled voice, trismus or signs of respiratory distress. Will continue to monitor.

## 2021-02-08 NOTE — ED ADULT NURSE REASSESSMENT NOTE - NS ED NURSE REASSESS COMMENT FT1
Pharmacy contacted, awaiting antibiotic from pharmacy at this time.
Sabrina OSBORN at bedside to eval pt for CDU, per Amanda HERNANDEZ pt does not need ABBOT swab for CDU as PCR will result by 1730. Will continue to monitor.
Pt received from KARYN Cruz. Pt oriented to CDU & plan of care was discussed. Pt endorses 5/10 throat pain. Pt denies any difficulty breathing or swallowing and states she was able to eat with minimal discomfort. Pt aware to notify RN or PA of any difficulty breathing or swallowing. Safety & comfort measures maintained. Call bell in reach. Will continue to monitor.
07.00 Am Received the Pt from  KARYN Ribeiro . Pt is Observed for throat pain . Received the Pt A&OX 4 obeys commands Angela N/V/D fever chills cp SOB   Comfort care & safety measures continued  IV site looks clean & dry no signs of infiltration noted pt denies  pain IV site .  Pt is advised to call for help  call bell with in the reach pt verbalized the understanding . Pt states  she has pain in throat  5/10 Medicated as per order  pending CDU  MD oconnor . GCS 15/15 A&OX 4 PERRLA  size 3 Strong upper & lower extremities steady gait   No facial droop  No Hand Leg drop denies numbness tingling Continue to monitor  10.30 Pt is evaluated by CDU MD Maryann Freed . pt is feeling better.  Pt is discharged . Ml jane Garcia explained the follow up care & gave the discharge summary  . Pt has stable vitals steady gait A&OX 4 at the time of Discharge

## 2021-02-08 NOTE — ED CDU PROVIDER DISPOSITION NOTE - PATIENT PORTAL LINK FT
You can access the FollowMyHealth Patient Portal offered by [facility] by registering at the following website: [email or web address]. By joining [employer]s FollowMyHealth portal, you will also be able to view your health information using other applications (apps) compatible with our system.

## 2021-02-08 NOTE — ED ADULT NURSE REASSESSMENT NOTE - COMFORT CARE
meal provided/plan of care explained/po fluids offered
ambulated to bathroom/meal provided/po fluids offered/wait time explained

## 2021-02-08 NOTE — ED CDU PROVIDER DISPOSITION NOTE - NSFOLLOWUPINSTRUCTIONS_ED_ALL_ED_FT
1) Follow-up with your Primary Medical Doctor. Call today / next business day for prompt follow-up.  2) Return to Emergency room for any worsening or persistent pain, weakness, fever, or any other concerning symptoms.  3) See attached instruction sheets for additional information, including information regarding signs and symptoms to look out for, reasons to seek immediate care and other important instructions.  4) Follow-up with any specialists as discussed / noted as well. 1) Follow-up with your Primary Medical Doctor. Call today / next business day for prompt follow-up.  2) Take Clindamycin as prescribed by Urgent Care. Please complete full course.  3) Recommend salt or hydrogen peroxide rinses 3 times a day for the next 3 days.   4) Return to Emergency room for any worsening or persistent pain, weakness, fever, or any other concerning symptoms.

## 2021-02-11 LAB
EBV EA AB SER IA-ACNC: 9.9 U/ML — HIGH
EBV EA AB TITR SER IF: POSITIVE
EBV EA IGG SER-ACNC: ABNORMAL
EBV NA IGG SER IA-ACNC: 300 U/ML — HIGH
EBV PATRN SPEC IB-IMP: SIGNIFICANT CHANGE UP
EBV VCA IGG AVIDITY SER QL IA: POSITIVE
EBV VCA IGM SER IA-ACNC: 27.6 U/ML — SIGNIFICANT CHANGE UP
EBV VCA IGM SER IA-ACNC: >750 U/ML — HIGH
EBV VCA IGM TITR FLD: NEGATIVE — SIGNIFICANT CHANGE UP

## 2021-02-11 NOTE — ED POST DISCHARGE NOTE - RESULT SUMMARY
Panel consistent with past infection.  No need for further patient contact at this time.  -Jerome Lawson PA-C

## 2021-06-23 NOTE — HISTORY OF PRESENT ILLNESS
[de-identified] : follow up on rash and swollen glands [FreeTextEntry6] : Pt presents today as a follow up. Was seen last week with complains swollen glands; then started getting rash. Rash is mostly resolved; glands less swollen. Pt on day 5 of prednisone. Labwork revealed + adenovirus titers. Symptoms and history consistent with adeno infection.  operating room

## 2021-07-06 ENCOUNTER — TRANSCRIPTION ENCOUNTER (OUTPATIENT)
Age: 26
End: 2021-07-06

## 2021-07-07 NOTE — ED ADULT NURSE NOTE - NSIMPLEMENTINTERV_GEN_ALL_ED
Left blank intentionally, created in error.
Implemented All Universal Safety Interventions:  [place] to call system. Call bell, personal items and telephone within reach. Instruct patient to call for assistance. Room bathroom lighting operational. Non-slip footwear when patient is off stretcher. Physically safe environment: no spills, clutter or unnecessary equipment. Stretcher in lowest position, wheels locked, appropriate side rails in place.

## 2022-08-22 NOTE — H&P ADULT - NSHPLABSRESULTS_GEN_ALL_CORE
CBC Full  -  ( 17 Jun 2018 23:32 )  WBC Count : 17.0 K/uL  Hemoglobin : 14.6 g/dL  Hematocrit : 45.4 %  Platelet Count - Automated : 362 K/uL  Mean Cell Volume : 81.2 fl  Mean Cell Hemoglobin : 26.1 pg  Mean Cell Hemoglobin Concentration : 32.1 gm/dL  Auto Neutrophil # : 13.3 K/uL  Auto Lymphocyte # : 2.6 K/uL  Auto Monocyte # : 0.9 K/uL  Auto Eosinophil # : 0.2 K/uL  Auto Basophil # : 0.1 K/uL  Auto Neutrophil % : 77.8 %  Auto Lymphocyte % : 15.4 %  Auto Monocyte % : 5.5 %  Auto Eosinophil % : 0.9 %  Auto Basophil % : 0.3 %    06-17    142  |  104  |  6<L>  ----------------------------<  92  3.9   |  22  |  0.80    Ca    9.5      17 Jun 2018 23:32    TPro  7.8  /  Alb  4.5  /  TBili  0.2  /  DBili  x   /  AST  11  /  ALT  16  /  AlkPhos  132<H>  06-17    < from: CT Abdomen and Pelvis w/ IV Cont (06.18.18 @ 02:40) >    PROCEDURE:   CT of the Abdomen and Pelvis was performed with intravenous contrast.   Intravenous contrast: 90 ml Omnipaque 350. 10 ml discarded.  Oral contrast: None.  Sagittal and coronal reformats were performed.    FINDINGS:    LOWER CHEST: Within normal limits.    LIVER: Within normal limits.  BILE DUCTS: Normal caliber.  GALLBLADDER: Within normal limits.  SPLEEN: Within normal limits.  PANCREAS: Within normal limits.  ADRENALS: Within normal limits.  KIDNEYS/URETERS: Within normal limits.    BLADDER: Within normal limits.  REPRODUCTIVE ORGANS: The uterus and adnexa are within normal limits.    BOWEL: Appendix is dilated measuring 8 mm in greatest diameter with a   straightened configuration compatible with an early uncomplicated acute   appendicitis. No fluid collections. No bowel obstruction.   PERITONEUM: No ascites. No intraperitoneal free air.  VESSELS:  Within normal limits.  RETROPERITONEUM: No lymphadenopathy.    ABDOMINAL WALL: Within normal limits.  BONES: Within normal limits.    IMPRESSION:     Early acute uncomplicated appendicitis.  No free air or focal collection.        < end of copied text >
4 = No assist / stand by assistance

## 2023-06-01 ENCOUNTER — OUTPATIENT (OUTPATIENT)
Dept: OUTPATIENT SERVICES | Facility: HOSPITAL | Age: 28
LOS: 1 days | End: 2023-06-01
Payer: COMMERCIAL

## 2023-06-01 ENCOUNTER — APPOINTMENT (OUTPATIENT)
Dept: CARDIOLOGY | Facility: CLINIC | Age: 28
End: 2023-06-01
Payer: COMMERCIAL

## 2023-06-01 ENCOUNTER — NON-APPOINTMENT (OUTPATIENT)
Age: 28
End: 2023-06-01

## 2023-06-01 ENCOUNTER — LABORATORY RESULT (OUTPATIENT)
Age: 28
End: 2023-06-01

## 2023-06-01 ENCOUNTER — APPOINTMENT (OUTPATIENT)
Dept: ELECTROPHYSIOLOGY | Facility: CLINIC | Age: 28
End: 2023-06-01
Payer: COMMERCIAL

## 2023-06-01 VITALS
DIASTOLIC BLOOD PRESSURE: 70 MMHG | HEART RATE: 82 BPM | OXYGEN SATURATION: 100 % | BODY MASS INDEX: 26.98 KG/M2 | WEIGHT: 160 LBS | SYSTOLIC BLOOD PRESSURE: 112 MMHG | HEIGHT: 64.5 IN

## 2023-06-01 DIAGNOSIS — Z98.890 OTHER SPECIFIED POSTPROCEDURAL STATES: Chronic | ICD-10-CM

## 2023-06-01 DIAGNOSIS — R06.00 DYSPNEA, UNSPECIFIED: ICD-10-CM

## 2023-06-01 DIAGNOSIS — R00.2 PALPITATIONS: ICD-10-CM

## 2023-06-01 DIAGNOSIS — R06.2 WHEEZING: ICD-10-CM

## 2023-06-01 PROCEDURE — 99205 OFFICE O/P NEW HI 60 MIN: CPT

## 2023-06-01 PROCEDURE — 71046 X-RAY EXAM CHEST 2 VIEWS: CPT | Mod: 26

## 2023-06-01 PROCEDURE — 93000 ELECTROCARDIOGRAM COMPLETE: CPT

## 2023-06-01 PROCEDURE — 71046 X-RAY EXAM CHEST 2 VIEWS: CPT

## 2023-06-01 RX ORDER — ALBUTEROL SULFATE 90 UG/1
108 (90 BASE) AEROSOL, METERED RESPIRATORY (INHALATION) EVERY 6 HOURS
Qty: 1 | Refills: 2 | Status: DISCONTINUED | COMMUNITY
Start: 2019-04-15 | End: 2023-06-01

## 2023-06-01 RX ORDER — LEVONORGESTREL/ETHINYL ESTRADIOL AND ETHINYL ESTRADIOL 100-20(84)
KIT ORAL
Refills: 0 | Status: ACTIVE | COMMUNITY

## 2023-06-01 RX ORDER — LAMOTRIGINE 25 MG/1
25 TABLET ORAL
Qty: 120 | Refills: 0 | Status: DISCONTINUED | COMMUNITY
Start: 2019-04-16 | End: 2023-06-01

## 2023-06-02 ENCOUNTER — APPOINTMENT (OUTPATIENT)
Dept: CARDIOLOGY | Facility: CLINIC | Age: 28
End: 2023-06-02
Payer: COMMERCIAL

## 2023-06-02 PROCEDURE — 93306 TTE W/DOPPLER COMPLETE: CPT

## 2023-06-13 ENCOUNTER — LABORATORY RESULT (OUTPATIENT)
Age: 28
End: 2023-06-13

## 2023-06-14 LAB
APPEARANCE: CLEAR
BILIRUBIN URINE: NEGATIVE
BLOOD URINE: NEGATIVE
COLOR: YELLOW
GLUCOSE QUALITATIVE U: NEGATIVE MG/DL
KETONES URINE: NEGATIVE MG/DL
LEUKOCYTE ESTERASE URINE: ABNORMAL
NITRITE URINE: NEGATIVE
PH URINE: 7.5
PROTEIN URINE: NEGATIVE MG/DL
SPECIFIC GRAVITY URINE: 1.02
UROBILINOGEN URINE: 0.2 MG/DL

## 2023-06-20 PROCEDURE — 93248 EXT ECG>7D<15D REV&INTERPJ: CPT

## 2023-06-26 PROBLEM — R06.00 DYSPNEA: Status: ACTIVE | Noted: 2023-06-26

## 2023-06-26 PROBLEM — R00.2 PALPITATION: Status: ACTIVE | Noted: 2023-06-26

## 2023-06-26 NOTE — HISTORY OF PRESENT ILLNESS
[FreeTextEntry1] : History of Present Illness:\par 27 year old female with a past medical history significant for\par \par cholecystectomy\par appendix\par left arm s/p fracture after falling off a toilet\par bariatric surgery/tummy tuck\par \par She began to feel short of breath.  It occurs at rest and feels like she has to take a deep breath.  No palpitations recently.  When she ambulates notes worsening of dyspnea upon exertion.  She is feeling light-headed and dizzy.  \par On Monday went kayaking and started not to feel well.  On Monday and Tuesday not feeling well and went to urgent care and was told that she has an URI.  Started a Z-Dioni with no improvement in her symptoms.  Stuffy and runny nose.  No odynophagia.  No sputum.  She has chills and is feeling sweaty.  No swelling in her legs.  Sometimes\par \par She used to feel her heart skip a beat in the past.  Saw a cardiologist previously and a monitor was placed for 3 days and per report was unremarkable.\par \par On birth control pills since she was 18 years old.  Went to [place] last weekend for over 90 minutes each way back and forth.  No history\par \par \par Assessment/plan\par Shortness of breath\par Palpitations\par \par -- For further evaluation the patient's shortness of breath, chills and sweats it is recommended that blood work be drawn and a chest x-ray and TTE be performed.  She is on birth control pills and was recently in a car for almost 3 hours on 1 day traveling back and forth to [place].  She is at increased risk for potential DVT/PE. indications and details for the blood work and TTE reviewed.  \par -- If the patient's shortness of breath persists and TTE is stable in nature she will need to be further evaluated.\par --Patient complaining of palpitations.  For further evaluation for palpitations it is recommended that an event recorder be placed.  Indications and details of the event recorder were gone over.\par --Explained to the patient the importance of following a heart healthy diet that is rich in fresh fruits, fresh vegetables, grilled fish and legumes.  She was encouraged when she is clinically feeling better to try to exercise for a minimum of 150 minutes of moderate intensity exercise a week.\par -- EKG performed due to shortness of breath and palpitations.\par \par All questions and concerns were addressed.\par

## 2023-06-26 NOTE — REVIEW OF SYSTEMS
[Fever] : fever [Chills] : chills [Feeling Fatigued] : feeling fatigued [SOB] : shortness of breath [Dyspnea on exertion] : dyspnea during exertion [Palpitations] : palpitations [Dizziness] : dizziness [Negative] : Heme/Lymph [FreeTextEntry2] : light-headed sensation

## 2023-11-03 ENCOUNTER — APPOINTMENT (OUTPATIENT)
Dept: ELECTROPHYSIOLOGY | Facility: CLINIC | Age: 28
End: 2023-11-03
Payer: COMMERCIAL

## 2023-11-06 ENCOUNTER — TRANSCRIPTION ENCOUNTER (OUTPATIENT)
Age: 28
End: 2023-11-06

## 2023-11-07 LAB
ESTIMATED AVERAGE GLUCOSE: 88 MG/DL
HBA1C MFR BLD HPLC: 4.7 %

## 2023-11-08 LAB
ALBUMIN SERPL ELPH-MCNC: 4.8 G/DL
ALP BLD-CCNC: 72 U/L
ALT SERPL-CCNC: 11 U/L
ANION GAP SERPL CALC-SCNC: 13 MMOL/L
AST SERPL-CCNC: 17 U/L
BILIRUB SERPL-MCNC: 0.3 MG/DL
BUN SERPL-MCNC: 9 MG/DL
CALCIUM SERPL-MCNC: 9 MG/DL
CHLORIDE SERPL-SCNC: 105 MMOL/L
CHOLEST SERPL-MCNC: 199 MG/DL
CO2 SERPL-SCNC: 24 MMOL/L
CREAT SERPL-MCNC: 0.65 MG/DL
EGFR: 123 ML/MIN/1.73M2
GLUCOSE SERPL-MCNC: 82 MG/DL
HDLC SERPL-MCNC: 71 MG/DL
LDLC SERPL CALC-MCNC: 109 MG/DL
NONHDLC SERPL-MCNC: 128 MG/DL
POTASSIUM SERPL-SCNC: 4.5 MMOL/L
PROT SERPL-MCNC: 6.9 G/DL
SODIUM SERPL-SCNC: 141 MMOL/L
TRIGL SERPL-MCNC: 109 MG/DL
TSH SERPL-ACNC: 1.22 UIU/ML

## 2023-11-10 PROCEDURE — 93228 REMOTE 30 DAY ECG REV/REPORT: CPT

## 2024-01-02 ENCOUNTER — NON-APPOINTMENT (OUTPATIENT)
Age: 29
End: 2024-01-02

## 2024-01-02 ENCOUNTER — APPOINTMENT (OUTPATIENT)
Dept: ELECTROPHYSIOLOGY | Facility: CLINIC | Age: 29
End: 2024-01-02
Payer: COMMERCIAL

## 2024-01-02 VITALS — HEART RATE: 77 BPM | OXYGEN SATURATION: 100 % | SYSTOLIC BLOOD PRESSURE: 106 MMHG | DIASTOLIC BLOOD PRESSURE: 73 MMHG

## 2024-01-02 PROCEDURE — 93000 ELECTROCARDIOGRAM COMPLETE: CPT

## 2024-01-02 PROCEDURE — 99204 OFFICE O/P NEW MOD 45 MIN: CPT

## 2024-01-02 RX ORDER — SEMAGLUTIDE 0.25 MG/.5ML
0.25 INJECTION, SOLUTION SUBCUTANEOUS
Qty: 4 | Refills: 0 | Status: DISCONTINUED | COMMUNITY
Start: 2023-08-16 | End: 2024-01-02

## 2024-01-02 RX ORDER — AZITHROMYCIN 250 MG/1
250 TABLET, FILM COATED ORAL
Qty: 1 | Refills: 0 | Status: DISCONTINUED | COMMUNITY
Start: 2019-04-13 | End: 2024-01-02

## 2024-01-02 NOTE — DISCUSSION/SUMMARY
[EKG obtained to assist in diagnosis and management of assessed problem(s)] : EKG obtained to assist in diagnosis and management of assessed problem(s) [FreeTextEntry1] : In summary, Ms. Gaspar is a 28 year old female with no significant past medical history who had an episode of vertigo recently and a MCOT monitor showing 6 beats of AIVR in the setting of a structurally normal heart based on Echocardiogram. Her vertigo episode does not appear to be cardiac in nature. We gave reassurance with patient and also recommended lifestyle modification (i.e. increase hydration). She will continue follow up with her PCP.

## 2024-01-02 NOTE — CARDIOLOGY SUMMARY
[de-identified] : today: SR @ 72bpm with short IA  [de-identified] : LEANNE 11/2023 6 beat of wide complex beats (likely NSVT/AIVR) Patient triggered events were for sinus rhythm, sinus tachycardia, and sinus tachycardia with NSVT (6 beats)  Ludy June 2023 rare SVE/VE Symptoms correlate with sinus rhythm and sinus tachycardia [de-identified] : 6/2/2023 Normal MV with minimal MR Aortic valve not well visualized; probably normal. No aortic valve regurgitation seen. Normal aorta Normal LA Normal LV internal dimensions and wall thicknesses Normal LV systolic function Normal diastolic function Normal RV size and function Estimated pulmonary artery systolic pressure equals 24 mmHg assuming RAP equals 8 mmHg c/w normal PA pressures.

## 2024-01-02 NOTE — REVIEW OF SYSTEMS
[Dizziness] : dizziness [Negative] : Heme/Lymph [FreeTextEntry5] : See HPI [de-identified] : see HPI

## 2024-01-02 NOTE — HISTORY OF PRESENT ILLNESS
[FreeTextEntry1] : I had the pleasure of seeing Sherrill Gaspar today for consultation for palpitations in the arrhythmia clinic at [facility]. As you well know, she is a pleasant 28 year old woman with a history of "skipped beats" dating back several years and had an episode of vertigo several weeks ago. She reports that she was driving to work when all of a sudden the room was spinning. The episode lasted for about a minute with no other associated symptoms. She saw a neurologist recently who diagnosed her with vertigo. She also had a 30 day Biotel monitor placed which demonstrated predominantly sinus rhythm with a PVC burden of 1% and 6 beats of AIVR. There were 5 symptom triggered events which all but one correlated with sinus rhythm/sinus tachycardia. One episode correlated to the 6 beats of AIVR. She does report infrequent episodes of brief self limiting "skipped beats" usually occurring once a month. She denies CP, SOB, near syncope or syncope. She denies any FHx of SCD or CM.

## 2024-02-08 ENCOUNTER — OUTPATIENT (OUTPATIENT)
Dept: OUTPATIENT SERVICES | Facility: HOSPITAL | Age: 29
LOS: 1 days | End: 2024-02-08
Payer: COMMERCIAL

## 2024-02-08 DIAGNOSIS — Z98.890 OTHER SPECIFIED POSTPROCEDURAL STATES: Chronic | ICD-10-CM

## 2024-02-08 DIAGNOSIS — R05.9 COUGH, UNSPECIFIED: ICD-10-CM

## 2024-02-08 PROCEDURE — 71046 X-RAY EXAM CHEST 2 VIEWS: CPT

## 2024-02-08 PROCEDURE — 71046 X-RAY EXAM CHEST 2 VIEWS: CPT | Mod: 26

## 2024-02-13 ENCOUNTER — NON-APPOINTMENT (OUTPATIENT)
Age: 29
End: 2024-02-13

## 2024-04-19 VITALS — HEIGHT: 65.4 IN | BODY MASS INDEX: 30.45 KG/M2 | WEIGHT: 185 LBS

## 2024-04-29 ENCOUNTER — NON-APPOINTMENT (OUTPATIENT)
Age: 29
End: 2024-04-29

## 2024-04-29 ENCOUNTER — OUTPATIENT (OUTPATIENT)
Dept: OUTPATIENT SERVICES | Facility: HOSPITAL | Age: 29
LOS: 1 days | End: 2024-04-29

## 2024-04-29 ENCOUNTER — APPOINTMENT (OUTPATIENT)
Dept: INTERNAL MEDICINE | Facility: CLINIC | Age: 29
End: 2024-04-29

## 2024-04-29 ENCOUNTER — TRANSCRIPTION ENCOUNTER (OUTPATIENT)
Age: 29
End: 2024-04-29

## 2024-04-30 ENCOUNTER — TRANSCRIPTION ENCOUNTER (OUTPATIENT)
Age: 29
End: 2024-04-30

## 2024-05-01 ENCOUNTER — TRANSCRIPTION ENCOUNTER (OUTPATIENT)
Age: 29
End: 2024-05-01

## 2024-05-20 ENCOUNTER — OUTPATIENT (OUTPATIENT)
Dept: OUTPATIENT SERVICES | Facility: HOSPITAL | Age: 29
LOS: 1 days | End: 2024-05-20
Payer: COMMERCIAL

## 2024-05-20 ENCOUNTER — RESULT REVIEW (OUTPATIENT)
Age: 29
End: 2024-05-20

## 2024-05-20 DIAGNOSIS — Z98.890 OTHER SPECIFIED POSTPROCEDURAL STATES: Chronic | ICD-10-CM

## 2024-05-20 DIAGNOSIS — H81.4 VERTIGO OF CENTRAL ORIGIN: ICD-10-CM

## 2024-05-20 PROCEDURE — 70553 MRI BRAIN STEM W/O & W/DYE: CPT

## 2024-05-20 PROCEDURE — 70553 MRI BRAIN STEM W/O & W/DYE: CPT | Mod: 26

## 2024-05-20 PROCEDURE — A9585: CPT

## 2024-05-20 RX ORDER — TIRZEPATIDE 5 MG/.5ML
5 INJECTION, SOLUTION SUBCUTANEOUS WEEKLY
Qty: 1 | Refills: 1 | Status: ACTIVE | COMMUNITY
Start: 2024-04-18 | End: 1900-01-01

## 2024-05-22 ENCOUNTER — APPOINTMENT (OUTPATIENT)
Dept: INTERNAL MEDICINE | Facility: CLINIC | Age: 29
End: 2024-05-22
Payer: COMMERCIAL

## 2024-05-22 VITALS
WEIGHT: 182 LBS | BODY MASS INDEX: 30.32 KG/M2 | HEART RATE: 81 BPM | SYSTOLIC BLOOD PRESSURE: 109 MMHG | HEIGHT: 65 IN | DIASTOLIC BLOOD PRESSURE: 74 MMHG | OXYGEN SATURATION: 99 %

## 2024-05-22 DIAGNOSIS — Z86.19 PERSONAL HISTORY OF OTHER INFECTIOUS AND PARASITIC DISEASES: ICD-10-CM

## 2024-05-22 DIAGNOSIS — E66.01 MORBID (SEVERE) OBESITY DUE TO EXCESS CALORIES: ICD-10-CM

## 2024-05-22 DIAGNOSIS — Z87.898 PERSONAL HISTORY OF OTHER SPECIFIED CONDITIONS: ICD-10-CM

## 2024-05-22 DIAGNOSIS — S42.302D UNSPECIFIED FRACTURE OF SHAFT OF HUMERUS, LEFT ARM, SUBSEQUENT ENCOUNTER FOR FRACTURE WITH ROUTINE HEALING: ICD-10-CM

## 2024-05-22 DIAGNOSIS — R05.9 COUGH, UNSPECIFIED: ICD-10-CM

## 2024-05-22 DIAGNOSIS — Z87.440 PERSONAL HISTORY OF URINARY (TRACT) INFECTIONS: ICD-10-CM

## 2024-05-22 DIAGNOSIS — B34.0 ADENOVIRUS INFECTION, UNSPECIFIED: ICD-10-CM

## 2024-05-22 DIAGNOSIS — L98.9 DISORDER OF THE SKIN AND SUBCUTANEOUS TISSUE, UNSPECIFIED: ICD-10-CM

## 2024-05-22 DIAGNOSIS — Z00.00 ENCOUNTER FOR GENERAL ADULT MEDICAL EXAMINATION W/OUT ABNORMAL FINDINGS: ICD-10-CM

## 2024-05-22 PROCEDURE — 99385 PREV VISIT NEW AGE 18-39: CPT

## 2024-05-22 NOTE — PLAN
[FreeTextEntry1] : 28F, here for annual physical  AUDIT C score is concerning for excessive alcohol use, alcohol counseling provided reduce alcohol. pt is  Morbid obesity: on Zepbound left ear lesions: pt to see derm for skin cancer check, pt had a history of tanning torres when younger.  HCM: routine b/w, PAP UTD.

## 2024-05-22 NOTE — PHYSICAL EXAM
[Normal Posterior Cervical Nodes] : no posterior cervical lymphadenopathy [Normal Anterior Cervical Nodes] : no anterior cervical lymphadenopathy [No Joint Swelling] : no joint swelling [Grossly Normal Strength/Tone] : grossly normal strength/tone [Coordination Grossly Intact] : coordination grossly intact [No Focal Deficits] : no focal deficits [Normal Gait] : normal gait [Normal Affect] : the affect was normal [Normal Insight/Judgement] : insight and judgment were intact [No Acute Distress] : no acute distress [Well Nourished] : well nourished [Well Developed] : well developed [Well-Appearing] : well-appearing [Normal Sclera/Conjunctiva] : normal sclera/conjunctiva [PERRL] : pupils equal round and reactive to light [EOMI] : extraocular movements intact [Normal] : normal sclera/conjunctiva, pupils are equal, round and reactive to light and extraocular movements are intact [Normal Outer Ear/Nose] : the outer ears and nose were normal in appearance [Normal Oropharynx] : the oropharynx was normal [No JVD] : no jugular venous distention [No Lymphadenopathy] : no lymphadenopathy [Supple] : supple [Thyroid Normal, No Nodules] : the thyroid was normal and there were no nodules present [No Respiratory Distress] : no respiratory distress  [No Accessory Muscle Use] : no accessory muscle use [Clear to Auscultation] : lungs were clear to auscultation bilaterally [Normal Rate] : normal rate  [Regular Rhythm] : with a regular rhythm [Normal S1, S2] : normal S1 and S2 [No Murmur] : no murmur heard [No Carotid Bruits] : no carotid bruits [No Abdominal Bruit] : a ~M bruit was not heard ~T in the abdomen [No Varicosities] : no varicosities [Pedal Pulses Present] : the pedal pulses are present [No Edema] : there was no peripheral edema [No Palpable Aorta] : no palpable aorta [No Extremity Clubbing/Cyanosis] : no extremity clubbing/cyanosis [Normal Appearance] : normal in appearance [No Nipple Discharge] : no nipple discharge [No Axillary Lymphadenopathy] : no axillary lymphadenopathy [Soft] : abdomen soft [Non Tender] : non-tender [Non-distended] : non-distended [No Masses] : no abdominal mass palpated [No HSM] : no HSM [Normal Bowel Sounds] : normal bowel sounds [No CVA Tenderness] : no CVA  tenderness [No Spinal Tenderness] : no spinal tenderness [No Rash] : no rash [Deep Tendon Reflexes (DTR)] : deep tendon reflexes were 2+ and symmetric [de-identified] : left ear with a 6mm hyperpigemented lesion

## 2024-05-22 NOTE — HISTORY OF PRESENT ILLNESS
[de-identified] : 28F, here for new patient visit.  pt had an episode of spinning sensation while driving 3-4 months ago. It self-resolved after a few seconds. Pt saw cardiology and her Holter/ekg/echo were WNL. She saw neurology for suspect vertigo, underwent MRI 2 days ago show an abnormal finding. Pt has appt with neurology in 2 days. Pt had an aunt with brain cancer. Pt reports a "lag in vision", shifting in her eyes when she turns her head.  Pt s/p gastric bypass 3 yrs ago and then underwent circumferential excessive skin removal a year later (2 yrs). Pt's post-course was c/b excessive bleeding.  Pt is on Zepbound 2.5mg week started 2 weeks ago, pt declines to be weight.  Pt had felt something abnormal after her weight loss

## 2024-05-22 NOTE — HEALTH RISK ASSESSMENT
[Never] : Never [Patient reported mammogram was normal] : Patient reported mammogram was normal [Patient reported PAP Smear was normal] : Patient reported PAP Smear was normal [2 - 3 times a week (3 pts)] : 2 - 3  times a week (3 points) [3 or 4 (1 pt)] : 3 or 4  (1 point) [Weekly (3 pts)] : Weekly (3 points) [PHQ-2 Negative - No further assessment needed] : PHQ-2 Negative - No further assessment needed [Audit-CScore] : 6 [MammogramDate] : 2022 [PapSmearDate] : 2023

## 2024-05-24 DIAGNOSIS — R30.0 DYSURIA: ICD-10-CM

## 2024-05-24 DIAGNOSIS — E53.8 DEFICIENCY OF OTHER SPECIFIED B GROUP VITAMINS: ICD-10-CM

## 2024-05-24 LAB
25(OH)D3 SERPL-MCNC: 25 NG/ML
ALBUMIN SERPL ELPH-MCNC: 4.7 G/DL
ALP BLD-CCNC: 75 U/L
ALT SERPL-CCNC: 11 U/L
ANION GAP SERPL CALC-SCNC: 11 MMOL/L
APPEARANCE: ABNORMAL
AST SERPL-CCNC: 16 U/L
BACTERIA: ABNORMAL /HPF
BASOPHILS # BLD AUTO: 0.07 K/UL
BASOPHILS NFR BLD AUTO: 0.8 %
BILIRUB SERPL-MCNC: 0.3 MG/DL
BILIRUBIN URINE: NEGATIVE
BLOOD URINE: ABNORMAL
BUN SERPL-MCNC: 10 MG/DL
CALCIUM SERPL-MCNC: 9.4 MG/DL
CAST: 5 /LPF
CHLORIDE SERPL-SCNC: 104 MMOL/L
CHOLEST SERPL-MCNC: 196 MG/DL
CO2 SERPL-SCNC: 24 MMOL/L
COLOR: NORMAL
CREAT SERPL-MCNC: 0.75 MG/DL
EGFR: 111 ML/MIN/1.73M2
EOSINOPHIL # BLD AUTO: 0.15 K/UL
EOSINOPHIL NFR BLD AUTO: 1.7 %
EPITHELIAL CELLS: 8 /HPF
ESTIMATED AVERAGE GLUCOSE: 88 MG/DL
FOLATE SERPL-MCNC: 10.2 NG/ML
GLUCOSE QUALITATIVE U: NEGATIVE MG/DL
GLUCOSE SERPL-MCNC: 78 MG/DL
HBA1C MFR BLD HPLC: 4.7 %
HCT VFR BLD CALC: 40.1 %
HCV AB SER QL: NONREACTIVE
HCV S/CO RATIO: 0.08 S/CO
HDLC SERPL-MCNC: 75 MG/DL
HGB BLD-MCNC: 13.5 G/DL
IMM GRANULOCYTES NFR BLD AUTO: 0.2 %
KETONES URINE: NEGATIVE MG/DL
LDLC SERPL CALC-MCNC: 102 MG/DL
LEUKOCYTE ESTERASE URINE: ABNORMAL
LYMPHOCYTES # BLD AUTO: 2.56 K/UL
LYMPHOCYTES NFR BLD AUTO: 28.2 %
MAN DIFF?: NORMAL
MCHC RBC-ENTMCNC: 27.7 PG
MCHC RBC-ENTMCNC: 33.7 GM/DL
MCV RBC AUTO: 82.2 FL
MICROSCOPIC-UA: NORMAL
MONOCYTES # BLD AUTO: 0.7 K/UL
MONOCYTES NFR BLD AUTO: 7.7 %
NEUTROPHILS # BLD AUTO: 5.57 K/UL
NEUTROPHILS NFR BLD AUTO: 61.4 %
NITRITE URINE: NEGATIVE
NONHDLC SERPL-MCNC: 121 MG/DL
PH URINE: 5.5
PLATELET # BLD AUTO: 292 K/UL
POTASSIUM SERPL-SCNC: 5.1 MMOL/L
PROT SERPL-MCNC: 7.1 G/DL
PROTEIN URINE: NORMAL MG/DL
RBC # BLD: 4.88 M/UL
RBC # FLD: 12.7 %
RED BLOOD CELLS URINE: 5 /HPF
REVIEW: NORMAL
SODIUM SERPL-SCNC: 139 MMOL/L
SPECIFIC GRAVITY URINE: >1.03
TRIGL SERPL-MCNC: 109 MG/DL
TSH SERPL-ACNC: 1.7 UIU/ML
UROBILINOGEN URINE: 0.2 MG/DL
VIT B12 SERPL-MCNC: 212 PG/ML
WBC # FLD AUTO: 9.07 K/UL
WHITE BLOOD CELLS URINE: 35 /HPF

## 2024-05-28 ENCOUNTER — LABORATORY RESULT (OUTPATIENT)
Age: 29
End: 2024-05-28

## 2024-05-30 ENCOUNTER — APPOINTMENT (OUTPATIENT)
Dept: INTERNAL MEDICINE | Facility: CLINIC | Age: 29
End: 2024-05-30
Payer: COMMERCIAL

## 2024-05-30 PROCEDURE — 96372 THER/PROPH/DIAG INJ SC/IM: CPT

## 2024-05-30 RX ORDER — CYANOCOBALAMIN 1000 UG/ML
1000 INJECTION INTRAMUSCULAR; SUBCUTANEOUS
Qty: 0 | Refills: 0 | Status: COMPLETED | OUTPATIENT
Start: 2024-05-30

## 2024-05-31 ENCOUNTER — TRANSCRIPTION ENCOUNTER (OUTPATIENT)
Age: 29
End: 2024-05-31

## 2024-05-31 LAB
APPEARANCE: CLEAR
BILIRUBIN URINE: NEGATIVE
BLOOD URINE: NEGATIVE
COLOR: YELLOW
GLUCOSE QUALITATIVE U: NEGATIVE MG/DL
KETONES URINE: NEGATIVE MG/DL
LEUKOCYTE ESTERASE URINE: NEGATIVE
NITRITE URINE: NEGATIVE
PH URINE: 8
PROTEIN URINE: 30 MG/DL
SPECIFIC GRAVITY URINE: 1.03
UROBILINOGEN URINE: 0.2 MG/DL

## 2024-06-05 RX ORDER — TIRZEPATIDE 2.5 MG/.5ML
2.5 INJECTION, SOLUTION SUBCUTANEOUS
Qty: 1 | Refills: 2 | Status: ACTIVE | COMMUNITY
Start: 2024-06-05 | End: 1900-01-01

## 2024-06-06 ENCOUNTER — MED ADMIN CHARGE (OUTPATIENT)
Age: 29
End: 2024-06-06

## 2024-06-06 ENCOUNTER — APPOINTMENT (OUTPATIENT)
Dept: INTERNAL MEDICINE | Facility: CLINIC | Age: 29
End: 2024-06-06
Payer: COMMERCIAL

## 2024-06-06 PROCEDURE — 96372 THER/PROPH/DIAG INJ SC/IM: CPT

## 2024-06-06 RX ORDER — CYANOCOBALAMIN 1000 UG/ML
1000 INJECTION INTRAMUSCULAR; SUBCUTANEOUS
Qty: 0 | Refills: 0 | Status: COMPLETED | OUTPATIENT
Start: 2024-06-06

## 2024-06-06 RX ADMIN — CYANOCOBALAMIN 0 MCG/ML: 1000 INJECTION, SOLUTION INTRAMUSCULAR at 00:00

## 2024-06-13 ENCOUNTER — APPOINTMENT (OUTPATIENT)
Dept: INTERNAL MEDICINE | Facility: CLINIC | Age: 29
End: 2024-06-13
Payer: COMMERCIAL

## 2024-06-13 ENCOUNTER — MED ADMIN CHARGE (OUTPATIENT)
Age: 29
End: 2024-06-13

## 2024-06-13 PROCEDURE — 96372 THER/PROPH/DIAG INJ SC/IM: CPT

## 2024-06-13 RX ORDER — CYANOCOBALAMIN 1000 UG/ML
1000 INJECTION INTRAMUSCULAR; SUBCUTANEOUS
Qty: 0 | Refills: 0 | Status: COMPLETED | OUTPATIENT
Start: 2024-06-13

## 2024-06-14 ENCOUNTER — APPOINTMENT (OUTPATIENT)
Dept: MRI IMAGING | Facility: CLINIC | Age: 29
End: 2024-06-14
Payer: COMMERCIAL

## 2024-06-14 PROCEDURE — 76390 MR SPECTROSCOPY: CPT

## 2024-06-14 PROCEDURE — 72156 MRI NECK SPINE W/O & W/DYE: CPT

## 2024-06-14 PROCEDURE — 72157 MRI CHEST SPINE W/O & W/DYE: CPT

## 2024-06-14 PROCEDURE — 70553 MRI BRAIN STEM W/O & W/DYE: CPT

## 2024-06-14 PROCEDURE — A9585: CPT | Mod: JW

## 2024-06-14 PROCEDURE — A9585: CPT

## 2024-06-17 ENCOUNTER — APPOINTMENT (OUTPATIENT)
Dept: MRI IMAGING | Facility: HOSPITAL | Age: 29
End: 2024-06-17

## 2024-06-20 ENCOUNTER — MED ADMIN CHARGE (OUTPATIENT)
Age: 29
End: 2024-06-20

## 2024-06-20 ENCOUNTER — APPOINTMENT (OUTPATIENT)
Dept: INTERNAL MEDICINE | Facility: CLINIC | Age: 29
End: 2024-06-20
Payer: COMMERCIAL

## 2024-06-20 PROCEDURE — 96372 THER/PROPH/DIAG INJ SC/IM: CPT

## 2024-06-20 RX ORDER — CYANOCOBALAMIN 1000 UG/ML
1000 INJECTION INTRAMUSCULAR; SUBCUTANEOUS
Qty: 0 | Refills: 0 | Status: COMPLETED | OUTPATIENT
Start: 2024-06-20

## 2024-06-21 RX ORDER — TIRZEPATIDE 7.5 MG/.5ML
7.5 INJECTION, SOLUTION SUBCUTANEOUS WEEKLY
Qty: 1 | Refills: 3 | Status: ACTIVE | COMMUNITY
Start: 2024-06-21 | End: 1900-01-01

## 2024-07-16 ENCOUNTER — TRANSCRIPTION ENCOUNTER (OUTPATIENT)
Age: 29
End: 2024-07-16

## 2024-07-22 ENCOUNTER — LABORATORY RESULT (OUTPATIENT)
Age: 29
End: 2024-07-22

## 2024-07-22 LAB
ALBUMIN SERPL ELPH-MCNC: 4.7 G/DL
ALP BLD-CCNC: 73 U/L
ALT SERPL-CCNC: 19 U/L
ANION GAP SERPL CALC-SCNC: 15 MMOL/L
APPEARANCE: ABNORMAL
AST SERPL-CCNC: 19 U/L
BILIRUB SERPL-MCNC: 0.5 MG/DL
BILIRUBIN URINE: ABNORMAL
BLOOD URINE: ABNORMAL
BUN SERPL-MCNC: 8 MG/DL
CALCIUM SERPL-MCNC: 9.3 MG/DL
CHLORIDE SERPL-SCNC: 103 MMOL/L
CO2 SERPL-SCNC: 22 MMOL/L
COLOR: NORMAL
CREAT SERPL-MCNC: 0.71 MG/DL
EGFR: 118 ML/MIN/1.73M2
GLUCOSE QUALITATIVE U: NEGATIVE MG/DL
GLUCOSE SERPL-MCNC: 69 MG/DL
KETONES URINE: 40 MG/DL
LEUKOCYTE ESTERASE URINE: ABNORMAL
NITRITE URINE: NEGATIVE
PH URINE: 6
POTASSIUM SERPL-SCNC: 4.6 MMOL/L
PROT SERPL-MCNC: 7.2 G/DL
PROTEIN URINE: 30 MG/DL
SODIUM SERPL-SCNC: 140 MMOL/L
SPECIFIC GRAVITY URINE: >1.03
UROBILINOGEN URINE: 1 MG/DL

## 2024-07-23 LAB
APPEARANCE: CLEAR
BILIRUBIN URINE: NEGATIVE
BLOOD URINE: NEGATIVE
COLOR: YELLOW
GLUCOSE QUALITATIVE U: NEGATIVE MG/DL
KETONES URINE: NEGATIVE MG/DL
LEUKOCYTE ESTERASE URINE: ABNORMAL
NITRITE URINE: NEGATIVE
PH URINE: 7
PROTEIN URINE: NEGATIVE MG/DL
SPECIFIC GRAVITY URINE: 1.01
UROBILINOGEN URINE: 0.2 MG/DL

## 2024-07-26 ENCOUNTER — NON-APPOINTMENT (OUTPATIENT)
Age: 29
End: 2024-07-26

## 2024-08-09 ENCOUNTER — APPOINTMENT (OUTPATIENT)
Dept: OBGYN | Facility: CLINIC | Age: 29
End: 2024-08-09

## 2024-08-09 PROBLEM — N76.0 RECURRENT VAGINITIS: Status: ACTIVE | Noted: 2024-08-09

## 2024-08-09 PROBLEM — R39.9 UTI SYMPTOMS: Status: ACTIVE | Noted: 2024-08-09

## 2024-08-09 PROBLEM — Z01.419 VISIT FOR GYNECOLOGIC EXAMINATION: Status: ACTIVE | Noted: 2024-08-09

## 2024-08-09 PROCEDURE — 99459 PELVIC EXAMINATION: CPT

## 2024-08-09 PROCEDURE — 99385 PREV VISIT NEW AGE 18-39: CPT

## 2024-08-09 PROCEDURE — 99212 OFFICE O/P EST SF 10 MIN: CPT | Mod: 25

## 2024-08-10 NOTE — PHYSICAL EXAM
[Chaperone Present] : A chaperone was present in the examining room during all aspects of the physical examination [Appropriately responsive] : appropriately responsive [Alert] : alert [No Acute Distress] : no acute distress [No Lymphadenopathy] : no lymphadenopathy [Soft] : soft [Non-tender] : non-tender [Non-distended] : non-distended [No Lesions] : no lesions [No HSM] : No HSM [No Mass] : no mass [Oriented x3] : oriented x3 [Examination Of The Breasts] : a normal appearance [No Masses] : no breast masses were palpable [Labia Majora] : normal [Labia Minora] : normal [Discharge] : a  ~M vaginal discharge was present [Moderate] : moderate [Cricket] : yellow [Thick] : thick [Mucoid] : mucoid [Normal] : normal [Uterine Adnexae] : normal [FreeTextEntry2] : Aishwarya Reno [Foul Smelling] : not foul smelling

## 2024-08-10 NOTE — PHYSICAL EXAM
[Chaperone Present] : A chaperone was present in the examining room during all aspects of the physical examination [Appropriately responsive] : appropriately responsive [Alert] : alert [No Acute Distress] : no acute distress [No Lymphadenopathy] : no lymphadenopathy [Soft] : soft [Non-tender] : non-tender [Non-distended] : non-distended [No HSM] : No HSM [No Lesions] : no lesions [Oriented x3] : oriented x3 [No Mass] : no mass [Examination Of The Breasts] : a normal appearance [No Masses] : no breast masses were palpable [Labia Majora] : normal [Labia Minora] : normal [Discharge] : a  ~M vaginal discharge was present [Moderate] : moderate [Cricket] : yellow [Mucoid] : mucoid [Thick] : thick [Normal] : normal [Uterine Adnexae] : normal [FreeTextEntry2] : Aishwarya Reno [Foul Smelling] : not foul smelling

## 2024-08-10 NOTE — HISTORY OF PRESENT ILLNESS
[Currently Active] : currently active [Men] : men [Vaginal] : vaginal [FreeTextEntry1] : referred by Dr. Flores-- in the office recurrent vaginitis and vaginal dryness vag infection martinez after sex. Same partner for 3 years.Has been on maint meds in the past but recur Rec Revaree for vag dryness but need to clear infection first [PapSmeardate] : 2022 [FreeTextEntry2] : female in past

## 2024-08-12 ENCOUNTER — TRANSCRIPTION ENCOUNTER (OUTPATIENT)
Age: 29
End: 2024-08-12

## 2024-08-12 DIAGNOSIS — Z78.9 OTHER SPECIFIED HEALTH STATUS: ICD-10-CM

## 2024-08-13 ENCOUNTER — TRANSCRIPTION ENCOUNTER (OUTPATIENT)
Age: 29
End: 2024-08-13

## 2024-08-21 ENCOUNTER — APPOINTMENT (OUTPATIENT)
Dept: INTERNAL MEDICINE | Facility: CLINIC | Age: 29
End: 2024-08-21
Payer: COMMERCIAL

## 2024-08-21 VITALS
DIASTOLIC BLOOD PRESSURE: 79 MMHG | OXYGEN SATURATION: 100 % | SYSTOLIC BLOOD PRESSURE: 112 MMHG | HEART RATE: 76 BPM | WEIGHT: 150 LBS | HEIGHT: 65 IN | BODY MASS INDEX: 24.99 KG/M2

## 2024-08-21 DIAGNOSIS — E53.8 DEFICIENCY OF OTHER SPECIFIED B GROUP VITAMINS: ICD-10-CM

## 2024-08-21 PROCEDURE — 99213 OFFICE O/P EST LOW 20 MIN: CPT | Mod: 25

## 2024-08-21 PROCEDURE — 96372 THER/PROPH/DIAG INJ SC/IM: CPT

## 2024-08-21 RX ORDER — CYANOCOBALAMIN 1000 UG/ML
1000 INJECTION INTRAMUSCULAR; SUBCUTANEOUS
Qty: 0 | Refills: 0 | Status: COMPLETED | OUTPATIENT
Start: 2024-08-21

## 2024-08-21 RX ADMIN — CYANOCOBALAMIN 0 MCG/ML: 1000 INJECTION, SOLUTION INTRAMUSCULAR at 00:00

## 2024-08-21 NOTE — PLAN
[FreeTextEntry1] : 29F, here for b12 deficiency.  pt advised to get 4 weekly IM b12 injections along with restarting oral methyl B12 1000mcg daily.  f/u 2m.

## 2024-08-21 NOTE — HISTORY OF PRESENT ILLNESS
[de-identified] : 28F, here for f/u b12 deficiency.  pt had increased to Zepbound 7.5mg but developed left facial numbness. She saw neurology, had b/w that showed B12 level.  pt went back to Zepbound 5mg weekly.  Pt recent B12 level checked was only 324.  pt not taking oral b12 supplements.

## 2024-08-21 NOTE — HISTORY OF PRESENT ILLNESS
[de-identified] : 28F, here for f/u b12 deficiency.  pt had increased to Zepbound 7.5mg but developed left facial numbness. She saw neurology, had b/w that showed B12 level.  pt went back to Zepbound 5mg weekly.  Pt recent B12 level checked was only 324.  pt not taking oral b12 supplements.

## 2024-08-21 NOTE — PHYSICAL EXAM
[No Edema] : there was no peripheral edema [Normal] : soft, non-tender, non-distended, no masses palpated, no HSM and normal bowel sounds [Coordination Grossly Intact] : coordination grossly intact [No Focal Deficits] : no focal deficits [Normal Gait] : normal gait

## 2024-08-26 ENCOUNTER — APPOINTMENT (OUTPATIENT)
Dept: OBGYN | Facility: CLINIC | Age: 29
End: 2024-08-26
Payer: COMMERCIAL

## 2024-08-26 DIAGNOSIS — N89.8 OTHER SPECIFIED NONINFLAMMATORY DISORDERS OF VAGINA: ICD-10-CM

## 2024-08-26 DIAGNOSIS — N76.0 ACUTE VAGINITIS: ICD-10-CM

## 2024-08-26 PROCEDURE — 99212 OFFICE O/P EST SF 10 MIN: CPT

## 2024-08-26 RX ORDER — PUMPKIN SEED EXTRACT/SOY GERM 300 MG
600 CAPSULE ORAL
Refills: 0 | Status: ACTIVE | COMMUNITY

## 2024-08-26 RX ORDER — CITRULL/ARGIN/PINE XT/ROSE HIP 400-400 MG
TABLET ORAL
Refills: 0 | Status: ACTIVE | COMMUNITY

## 2024-08-26 NOTE — REASON FOR VISIT
[Follow-Up] : a follow-up evaluation of [Other Location: e.g. School (Enter Location, City,State)___] : at [unfilled], at the time of the visit. [Other Location: e.g. Home (Enter Location, City,State)___] : at [unfilled] [Patient] : the patient

## 2024-08-26 NOTE — COUNSELING
[Nutrition/ Exercise/ Weight Management] : nutrition, exercise, weight management [Vitamins/Supplements] : vitamins/supplements [Contraception/ Emergency Contraception/ Safe Sexual Practices] : contraception, emergency contraception, safe sexual practices [FreeTextEntry2] : vaginal health

## 2024-08-26 NOTE — PLAN
[FreeTextEntry1] : Rev vag culture Rec boric acid suppos and Revaree Extensive review of above mentioned concerns. All questions and concerns addressed, patient expressed understanding. Encouraged to contact the office with any questions or concerns.

## 2024-08-28 ENCOUNTER — APPOINTMENT (OUTPATIENT)
Dept: INTERNAL MEDICINE | Facility: CLINIC | Age: 29
End: 2024-08-28
Payer: COMMERCIAL

## 2024-08-28 PROCEDURE — 96372 THER/PROPH/DIAG INJ SC/IM: CPT

## 2024-08-28 RX ORDER — CYANOCOBALAMIN 1000 UG/ML
1000 INJECTION INTRAMUSCULAR; SUBCUTANEOUS
Qty: 0 | Refills: 0 | Status: COMPLETED | OUTPATIENT
Start: 2024-08-28

## 2024-09-03 ENCOUNTER — TRANSCRIPTION ENCOUNTER (OUTPATIENT)
Age: 29
End: 2024-09-03

## 2024-09-04 ENCOUNTER — APPOINTMENT (OUTPATIENT)
Dept: INTERNAL MEDICINE | Facility: CLINIC | Age: 29
End: 2024-09-04
Payer: COMMERCIAL

## 2024-09-04 ENCOUNTER — APPOINTMENT (OUTPATIENT)
Dept: OBGYN | Facility: CLINIC | Age: 29
End: 2024-09-04
Payer: COMMERCIAL

## 2024-09-04 ENCOUNTER — TRANSCRIPTION ENCOUNTER (OUTPATIENT)
Age: 29
End: 2024-09-04

## 2024-09-04 VITALS
HEART RATE: 92 BPM | BODY MASS INDEX: 24.16 KG/M2 | DIASTOLIC BLOOD PRESSURE: 72 MMHG | SYSTOLIC BLOOD PRESSURE: 105 MMHG | WEIGHT: 145 LBS | HEIGHT: 65 IN

## 2024-09-04 DIAGNOSIS — R10.2 PELVIC AND PERINEAL PAIN: ICD-10-CM

## 2024-09-04 DIAGNOSIS — E66.01 MORBID (SEVERE) OBESITY DUE TO EXCESS CALORIES: ICD-10-CM

## 2024-09-04 LAB
BILIRUB UR QL STRIP: NORMAL
CLARITY UR: NORMAL
COLLECTION METHOD: NORMAL
GLUCOSE UR-MCNC: NORMAL
HCG UR QL: 0.2 EU/DL
HGB UR QL STRIP.AUTO: NORMAL
KETONES UR-MCNC: NORMAL
LEUKOCYTE ESTERASE UR QL STRIP: NORMAL
NITRITE UR QL STRIP: NORMAL
PH UR STRIP: 7
PROT UR STRIP-MCNC: NORMAL
SP GR UR STRIP: 1.03

## 2024-09-04 PROCEDURE — 99212 OFFICE O/P EST SF 10 MIN: CPT

## 2024-09-04 PROCEDURE — 96372 THER/PROPH/DIAG INJ SC/IM: CPT

## 2024-09-04 PROCEDURE — 99459 PELVIC EXAMINATION: CPT

## 2024-09-04 RX ORDER — CYANOCOBALAMIN 1000 UG/ML
1000 INJECTION INTRAMUSCULAR; SUBCUTANEOUS
Qty: 0 | Refills: 0 | Status: COMPLETED | OUTPATIENT
Start: 2024-09-04

## 2024-09-04 NOTE — PHYSICAL EXAM
[Chaperone Present] : A chaperone was present in the examining room during all aspects of the physical examination [Appropriately responsive] : appropriately responsive [Alert] : alert [No Acute Distress] : no acute distress [No Lymphadenopathy] : no lymphadenopathy [Soft] : soft [Non-tender] : non-tender [Non-distended] : non-distended [No HSM] : No HSM [No Lesions] : no lesions [No Mass] : no mass [Oriented x3] : oriented x3 [Labia Majora] : normal [Labia Minora] : normal [Discharge] : a  ~M vaginal discharge was present [Moderate] : moderate [Cricket] : yellow [Thick] : thick [Mucoid] : mucoid [Normal] : normal [Uterine Adnexae] : normal [FreeTextEntry2] : Aishwarya Reno [Foul Smelling] : not foul smelling

## 2024-09-04 NOTE — PLAN
[FreeTextEntry1] : Differential diagnosis, work up ,management and evaluation of above mentioned concerns extensively reviewed.ex All questions and concerns addressed, patient expressed understanding. Encouraged to contact the office with any questions or concerns. All questions and concerns addressed, patient expressed understanding. Encouraged to contact the office with any questions or concerns.

## 2024-09-04 NOTE — HISTORY OF PRESENT ILLNESS
[FreeTextEntry1] : c/o pelvic pressure--occurred a week ago after sex resolved with diflucan--sxs recurred after sex last night

## 2024-09-05 ENCOUNTER — TRANSCRIPTION ENCOUNTER (OUTPATIENT)
Age: 29
End: 2024-09-05

## 2024-09-05 LAB
BV BACTERIA RRNA VAG QL NAA+PROBE: NOT DETECTED
C GLABRATA RNA VAG QL NAA+PROBE: NOT DETECTED
C TRACH RRNA SPEC QL NAA+PROBE: NOT DETECTED
CANDIDA RRNA VAG QL PROBE: NOT DETECTED
N GONORRHOEA RRNA SPEC QL NAA+PROBE: NOT DETECTED
T VAGINALIS RRNA SPEC QL NAA+PROBE: NOT DETECTED

## 2024-09-08 LAB — BACTERIA UR CULT: NORMAL

## 2024-09-11 ENCOUNTER — APPOINTMENT (OUTPATIENT)
Dept: INTERNAL MEDICINE | Facility: CLINIC | Age: 29
End: 2024-09-11
Payer: COMMERCIAL

## 2024-09-11 ENCOUNTER — MED ADMIN CHARGE (OUTPATIENT)
Age: 29
End: 2024-09-11

## 2024-09-11 PROCEDURE — 96372 THER/PROPH/DIAG INJ SC/IM: CPT

## 2024-09-11 RX ORDER — CYANOCOBALAMIN 1000 UG/ML
1000 INJECTION INTRAMUSCULAR; SUBCUTANEOUS
Qty: 0 | Refills: 0 | Status: COMPLETED | OUTPATIENT
Start: 2024-09-11

## 2024-09-11 NOTE — ED ADULT TRIAGE NOTE - ESI TRIAGE ACUITY LEVEL, MLM
[FreeTextEntry1] : 62M for annual f/u Hx of ED and Male Stress Incontinence/ post prosttaectomy incontinenece s/p IPP 2/14/14,  CX  Pt was admitted to [facility] 8/23/24 w/ Acute UTI Urine culture + for E Coli Currently uses 2-3 diapers/day for incontinence pt and wife are interested in an AUS  PMH significant for Prostate CA 2008 with adjuvant radiation 2009
[FreeTextEntry1] : 62M for annual f/u Hx of ED and Male Stress Incontinence/ post prosttaectomy incontinenece s/p IPP 2/14/14,  CX  Pt was admitted to [facility] 8/23/24 w/ Acute UTI Urine culture + for E Coli Currently uses 2-3 diapers/day for incontinence pt and wife are interested in an AUS  PMH significant for Prostate CA 2008 with adjuvant radiation 2009
3

## 2024-09-12 ENCOUNTER — OUTPATIENT (OUTPATIENT)
Dept: OUTPATIENT SERVICES | Facility: HOSPITAL | Age: 29
LOS: 1 days | End: 2024-09-12
Payer: COMMERCIAL

## 2024-09-12 ENCOUNTER — APPOINTMENT (OUTPATIENT)
Dept: MRI IMAGING | Facility: HOSPITAL | Age: 29
End: 2024-09-12

## 2024-09-12 DIAGNOSIS — Z00.00 ENCOUNTER FOR GENERAL ADULT MEDICAL EXAMINATION WITHOUT ABNORMAL FINDINGS: ICD-10-CM

## 2024-09-12 DIAGNOSIS — Z98.890 OTHER SPECIFIED POSTPROCEDURAL STATES: Chronic | ICD-10-CM

## 2024-09-12 DIAGNOSIS — H81.4 VERTIGO OF CENTRAL ORIGIN: ICD-10-CM

## 2024-09-12 PROCEDURE — 70553 MRI BRAIN STEM W/O & W/DYE: CPT | Mod: 26

## 2024-09-12 PROCEDURE — A9585: CPT

## 2024-09-12 PROCEDURE — 70553 MRI BRAIN STEM W/O & W/DYE: CPT

## 2024-09-18 ENCOUNTER — APPOINTMENT (OUTPATIENT)
Dept: NEUROSURGERY | Facility: CLINIC | Age: 29
End: 2024-09-18
Payer: COMMERCIAL

## 2024-09-18 VITALS
HEART RATE: 77 BPM | SYSTOLIC BLOOD PRESSURE: 104 MMHG | RESPIRATION RATE: 16 BRPM | WEIGHT: 140 LBS | DIASTOLIC BLOOD PRESSURE: 76 MMHG | HEIGHT: 65 IN | TEMPERATURE: 98.8 F | BODY MASS INDEX: 23.32 KG/M2

## 2024-09-18 DIAGNOSIS — R90.89 OTHER ABNORMAL FINDINGS ON DIAGNOSTIC IMAGING OF CENTRAL NERVOUS SYSTEM: ICD-10-CM

## 2024-09-18 PROCEDURE — 99203 OFFICE O/P NEW LOW 30 MIN: CPT

## 2024-09-18 NOTE — HISTORY OF PRESENT ILLNESS
[de-identified] : This is a 29-year-old female with a history of gastric bypass surgery 4 years ago, followed by circumferential excessive skin removal 3 years ago. Her postoperative course was complicated by excessive bleeding. The patient is currently on Zepbound 2.5 mg.  She experienced an episode of a spinning sensation while driving at the end of 2023, which self-resolved after a few seconds.  Following this, she underwent a cardiology workup, including Holter monitoring, EKG, and echocardiogram, all of which were within normal limits. She was then referred to neurology due to suspected vertigo, and an MRI performed two days ago revealed an abnormal finding (see bellow). A repeat MRI performed one month and three months later showed stability of these findings. Magnetic Resonance Spectroscopy (MRS) indicated a low likelihood of a tumor.

## 2024-09-18 NOTE — ASSESSMENT
[FreeTextEntry1] :   Plan:  - The case will be presented at the Tumor Board next week. - Repeat MRI in 6 months.

## 2024-09-18 NOTE — HISTORY OF PRESENT ILLNESS
[de-identified] : This is a 29-year-old female with a history of gastric bypass surgery 4 years ago, followed by circumferential excessive skin removal 3 years ago. Her postoperative course was complicated by excessive bleeding. The patient is currently on Zepbound 2.5 mg.  She experienced an episode of a spinning sensation while driving at the end of 2023, which self-resolved after a few seconds.  Following this, she underwent a cardiology workup, including Holter monitoring, EKG, and echocardiogram, all of which were within normal limits. She was then referred to neurology due to suspected vertigo, and an MRI performed two days ago revealed an abnormal finding (see bellow). A repeat MRI performed one month and three months later showed stability of these findings. Magnetic Resonance Spectroscopy (MRS) indicated a low likelihood of a tumor.

## 2024-09-18 NOTE — DATA REVIEWED
[de-identified] :   PROCEDURE DATE: 09/12/2024    INTERPRETATION: CLINICAL INDICATION: Follow-up right parietal cortical abnormality   Magnetic resonance imaging of the brain was carried out with transaxial SPGR, FLAIR, fast spin echo T2 weighted images, axial susceptibility weighted series, diffusion weighted series and sagittal T1 weighted series on a 1.5 Antonette magnet. Post contrast axial, coronal and sagittal T1 weighted images were obtained. 6.5 cc of Gadavist were intravenously injected, 1.0 cc were discarded.  Comparison is made with the prior MRI 6/14/2024.  The fourth, third and lateral ventricles are normal in size and position. There is no hemorrhage, mass or shift of the midline structures. There is subtle increased signal intensity on the T2-weighted images in the right parietal cortex which is unchanged since the prior exam. There is no abnormal enhancement. Previous MR spectroscopy had demonstrated normal spectra suggesting this may represent cortical dysplasia although a low-grade neoplasm is not completely excluded.  No acute infarcts or hemorrhage are identified. The sellar and parasellar structures are unremarkable.  Paranasal sinuses are clear.  IMPRESSION: No change in the nonenhancing right parietal cortical focus of increased signal intensity which may represent cortical dysplasia versus low-grade neoplasm.  --- End of Report ---  09/12/2024: No change in the nonenhancing right parietal cortical focus of increased signal intensity which may represent cortical dysplasia versus low-grade neoplasm.

## 2024-09-18 NOTE — DATA REVIEWED
[de-identified] :   PROCEDURE DATE: 09/12/2024    INTERPRETATION: CLINICAL INDICATION: Follow-up right parietal cortical abnormality   Magnetic resonance imaging of the brain was carried out with transaxial SPGR, FLAIR, fast spin echo T2 weighted images, axial susceptibility weighted series, diffusion weighted series and sagittal T1 weighted series on a 1.5 Antonette magnet. Post contrast axial, coronal and sagittal T1 weighted images were obtained. 6.5 cc of Gadavist were intravenously injected, 1.0 cc were discarded.  Comparison is made with the prior MRI 6/14/2024.  The fourth, third and lateral ventricles are normal in size and position. There is no hemorrhage, mass or shift of the midline structures. There is subtle increased signal intensity on the T2-weighted images in the right parietal cortex which is unchanged since the prior exam. There is no abnormal enhancement. Previous MR spectroscopy had demonstrated normal spectra suggesting this may represent cortical dysplasia although a low-grade neoplasm is not completely excluded.  No acute infarcts or hemorrhage are identified. The sellar and parasellar structures are unremarkable.  Paranasal sinuses are clear.  IMPRESSION: No change in the nonenhancing right parietal cortical focus of increased signal intensity which may represent cortical dysplasia versus low-grade neoplasm.  --- End of Report ---  09/12/2024: No change in the nonenhancing right parietal cortical focus of increased signal intensity which may represent cortical dysplasia versus low-grade neoplasm.

## 2024-09-23 PROBLEM — R90.89 ABNORMAL BRAIN MRI: Status: ACTIVE | Noted: 2024-09-23

## 2024-10-01 ENCOUNTER — TRANSCRIPTION ENCOUNTER (OUTPATIENT)
Age: 29
End: 2024-10-01

## 2024-10-02 ENCOUNTER — APPOINTMENT (OUTPATIENT)
Dept: OBGYN | Facility: CLINIC | Age: 29
End: 2024-10-02
Payer: COMMERCIAL

## 2024-10-02 VITALS
BODY MASS INDEX: 23.49 KG/M2 | HEART RATE: 103 BPM | DIASTOLIC BLOOD PRESSURE: 76 MMHG | WEIGHT: 141 LBS | SYSTOLIC BLOOD PRESSURE: 117 MMHG | HEIGHT: 65 IN

## 2024-10-02 PROCEDURE — 99459 PELVIC EXAMINATION: CPT

## 2024-10-02 PROCEDURE — 99213 OFFICE O/P EST LOW 20 MIN: CPT

## 2024-10-02 NOTE — HISTORY OF PRESENT ILLNESS
[FreeTextEntry1] : 30 y/o presents with vaginal irritation since Saturday. Denies new soaps, detergents, sexual partners. Denies recent abx use. Took boric acid.

## 2024-10-02 NOTE — PHYSICAL EXAM
[Chaperone Present] : A chaperone was present in the examining room during all aspects of the physical examination [[id number]] : A chaperone was present during the pelvic exam. [FreeTextEntry2] : Veronika ALATORRE [Appropriately responsive] : appropriately responsive [Alert] : alert [No Acute Distress] : no acute distress [Regular Rate Rhythm] : regular rate rhythm [Oriented x3] : oriented x3 [Labia Majora] : normal [Labia Minora] : normal [Normal] : normal

## 2024-10-03 ENCOUNTER — TRANSCRIPTION ENCOUNTER (OUTPATIENT)
Age: 29
End: 2024-10-03

## 2024-10-03 DIAGNOSIS — N89.8 OTHER SPECIFIED NONINFLAMMATORY DISORDERS OF VAGINA: ICD-10-CM

## 2024-10-03 LAB
BV BACTERIA RRNA VAG QL NAA+PROBE: NOT DETECTED
C GLABRATA RNA VAG QL NAA+PROBE: NOT DETECTED
C TRACH RRNA SPEC QL NAA+PROBE: NOT DETECTED
CANDIDA RRNA VAG QL PROBE: DETECTED
N GONORRHOEA RRNA SPEC QL NAA+PROBE: NOT DETECTED
T VAGINALIS RRNA SPEC QL NAA+PROBE: NOT DETECTED

## 2024-10-03 RX ORDER — FLUCONAZOLE 150 MG/1
150 TABLET ORAL
Qty: 2 | Refills: 1 | Status: ACTIVE | COMMUNITY
Start: 2024-10-03 | End: 1900-01-01

## 2024-10-15 ENCOUNTER — TRANSCRIPTION ENCOUNTER (OUTPATIENT)
Age: 29
End: 2024-10-15

## 2024-10-15 RX ORDER — FLUCONAZOLE 150 MG/1
150 TABLET ORAL
Qty: 12 | Refills: 1 | Status: ACTIVE | COMMUNITY
Start: 2024-10-15 | End: 1900-01-01

## 2024-10-31 ENCOUNTER — APPOINTMENT (OUTPATIENT)
Dept: INTERNAL MEDICINE | Facility: CLINIC | Age: 29
End: 2024-10-31
Payer: COMMERCIAL

## 2024-10-31 VITALS
HEART RATE: 107 BPM | TEMPERATURE: 98.2 F | HEIGHT: 65 IN | DIASTOLIC BLOOD PRESSURE: 65 MMHG | OXYGEN SATURATION: 89 % | WEIGHT: 133 LBS | SYSTOLIC BLOOD PRESSURE: 103 MMHG | BODY MASS INDEX: 22.16 KG/M2

## 2024-10-31 DIAGNOSIS — R05.9 COUGH, UNSPECIFIED: ICD-10-CM

## 2024-10-31 DIAGNOSIS — J02.9 ACUTE PHARYNGITIS, UNSPECIFIED: ICD-10-CM

## 2024-10-31 PROCEDURE — 99213 OFFICE O/P EST LOW 20 MIN: CPT

## 2024-11-05 RX ORDER — AZITHROMYCIN 250 MG/1
250 TABLET, FILM COATED ORAL
Qty: 1 | Refills: 0 | Status: ACTIVE | COMMUNITY
Start: 2024-11-05 | End: 1900-01-01

## 2024-11-18 LAB
ALBUMIN SERPL ELPH-MCNC: 4.5 G/DL
ALP BLD-CCNC: 89 U/L
ALT SERPL-CCNC: 12 U/L
ANION GAP SERPL CALC-SCNC: 9 MMOL/L
AST SERPL-CCNC: 15 U/L
BILIRUB SERPL-MCNC: 0.4 MG/DL
BUN SERPL-MCNC: 11 MG/DL
CALCIUM SERPL-MCNC: 9.6 MG/DL
CHLORIDE SERPL-SCNC: 104 MMOL/L
CHOLEST SERPL-MCNC: 177 MG/DL
CO2 SERPL-SCNC: 27 MMOL/L
CREAT SERPL-MCNC: 0.76 MG/DL
EGFR: 109 ML/MIN/1.73M2
FOLATE SERPL-MCNC: 3 NG/ML
GLUCOSE SERPL-MCNC: 83 MG/DL
HCT VFR BLD CALC: 40.3 %
HDLC SERPL-MCNC: 75 MG/DL
HGB BLD-MCNC: 13.4 G/DL
LDLC SERPL CALC-MCNC: 90 MG/DL
MCHC RBC-ENTMCNC: 27.4 PG
MCHC RBC-ENTMCNC: 33.3 G/DL
MCV RBC AUTO: 82.4 FL
NONHDLC SERPL-MCNC: 102 MG/DL
PLATELET # BLD AUTO: 272 K/UL
POTASSIUM SERPL-SCNC: 4.9 MMOL/L
PROT SERPL-MCNC: 6.7 G/DL
RBC # BLD: 4.89 M/UL
RBC # FLD: 12.8 %
SODIUM SERPL-SCNC: 139 MMOL/L
TRIGL SERPL-MCNC: 67 MG/DL
VIT B12 SERPL-MCNC: 316 PG/ML
WBC # FLD AUTO: 7.53 K/UL

## 2024-11-20 ENCOUNTER — TRANSCRIPTION ENCOUNTER (OUTPATIENT)
Age: 29
End: 2024-11-20

## 2024-12-12 NOTE — ED ADULT TRIAGE NOTE - SOURCE OF INFORMATION
[Time Spent: ___ minutes] : I have spent [unfilled] minutes of time on the encounter which excludes teaching and separately reported services.
Patient

## 2025-01-03 ENCOUNTER — NON-APPOINTMENT (OUTPATIENT)
Age: 30
End: 2025-01-03

## 2025-01-08 ENCOUNTER — APPOINTMENT (OUTPATIENT)
Dept: OBGYN | Facility: CLINIC | Age: 30
End: 2025-01-08
Payer: COMMERCIAL

## 2025-01-08 ENCOUNTER — ASOB RESULT (OUTPATIENT)
Age: 30
End: 2025-01-08

## 2025-01-08 VITALS
SYSTOLIC BLOOD PRESSURE: 123 MMHG | WEIGHT: 128 LBS | BODY MASS INDEX: 21.33 KG/M2 | HEIGHT: 65 IN | HEART RATE: 116 BPM | DIASTOLIC BLOOD PRESSURE: 79 MMHG

## 2025-01-08 DIAGNOSIS — N89.8 OTHER SPECIFIED NONINFLAMMATORY DISORDERS OF VAGINA: ICD-10-CM

## 2025-01-08 DIAGNOSIS — N92.1 EXCESSIVE AND FREQUENT MENSTRUATION WITH IRREGULAR CYCLE: ICD-10-CM

## 2025-01-08 LAB
HCG UR QL: NEGATIVE
HCG UR QL: NEGATIVE
QUALITY CONTROL: YES
QUALITY CONTROL: YES

## 2025-01-08 PROCEDURE — 57150 TREAT VAGINA INFECTION: CPT

## 2025-01-08 PROCEDURE — 76830 TRANSVAGINAL US NON-OB: CPT

## 2025-01-08 PROCEDURE — 99213 OFFICE O/P EST LOW 20 MIN: CPT | Mod: 25

## 2025-01-08 PROCEDURE — 99459 PELVIC EXAMINATION: CPT

## 2025-01-09 ENCOUNTER — TRANSCRIPTION ENCOUNTER (OUTPATIENT)
Age: 30
End: 2025-01-09

## 2025-01-22 ENCOUNTER — LABORATORY RESULT (OUTPATIENT)
Age: 30
End: 2025-01-22

## 2025-01-22 ENCOUNTER — APPOINTMENT (OUTPATIENT)
Dept: INTERNAL MEDICINE | Facility: CLINIC | Age: 30
End: 2025-01-22
Payer: COMMERCIAL

## 2025-01-22 VITALS
DIASTOLIC BLOOD PRESSURE: 64 MMHG | HEIGHT: 65 IN | BODY MASS INDEX: 21.33 KG/M2 | SYSTOLIC BLOOD PRESSURE: 104 MMHG | WEIGHT: 128 LBS | OXYGEN SATURATION: 97 % | HEART RATE: 81 BPM

## 2025-01-22 DIAGNOSIS — N63.0 UNSPECIFIED LUMP IN UNSPECIFIED BREAST: ICD-10-CM

## 2025-01-22 DIAGNOSIS — E53.8 DEFICIENCY OF OTHER SPECIFIED B GROUP VITAMINS: ICD-10-CM

## 2025-01-22 PROCEDURE — G2211 COMPLEX E/M VISIT ADD ON: CPT

## 2025-01-22 PROCEDURE — 99214 OFFICE O/P EST MOD 30 MIN: CPT | Mod: 25

## 2025-01-22 PROCEDURE — 96372 THER/PROPH/DIAG INJ SC/IM: CPT

## 2025-01-22 RX ORDER — CYANOCOBALAMIN 1000 UG/ML
1000 INJECTION, SOLUTION INTRAMUSCULAR; SUBCUTANEOUS
Qty: 0 | Refills: 0 | Status: COMPLETED | OUTPATIENT
Start: 2025-01-22

## 2025-01-22 RX ADMIN — CYANOCOBALAMIN 0 MCG/ML: 1000 INJECTION, SOLUTION INTRAMUSCULAR at 00:00

## 2025-01-24 NOTE — H&P ADULT - NSHPPOAPULMEMBOLUS_GEN_A_CORE
"              .        Admission Medication History     The home medication history was taken by Leydi Nicholson.    You may go to "Admission" then "Reconcile Home Medications" tabs to review and/or act upon these items.     The home medication list has been updated by the Pharmacy department.   Please read ALL comments highlighted in yellow.   Please address this information as you see fit.    Feel free to contact us if you have any questions or require assistance.      The medications listed below were removed from the home medication list. Please reorder if appropriate:  Patient reports no longer taking the following medication(s):  Allegra   Flonase      Medications listed below were obtained from: Patient/family and Analytic software- [employer]  Current Facility-Administered Medications on File Prior to Encounter   Medication Dose Route Frequency Provider Last Rate Last Admin    evolocumab PnIj 140 mg  140 mg Subcutaneous Q14 Days Kenton Ware MD         Current Outpatient Medications on File Prior to Encounter   Medication Sig Dispense Refill    acetaminophen (TYLENOL) 650 MG TbSR Take 650 mg by mouth every 6 (six) hours as needed for Pain.      ALPRAZolam (XANAX) 0.25 MG tablet TAKE 1 TABLET BY MOUTH EVERY DAY AS NEEDED FOR ANXIETY (Patient taking differently: Take 0.25 mg by mouth daily as needed for Anxiety. TAKE 1 TABLET BY MOUTH EVERY DAY AS NEEDED FOR ANXIETY) 90 tablet 0    apixaban (ELIQUIS) 2.5 mg Tab Take 1 tablet (2.5 mg total) by mouth 2 (two) times daily. 180 tablet 3    ascorbic acid, vitamin C, (VITAMIN C) 1000 MG tablet Take 1,000 mg by mouth once daily.      aspirin (ECOTRIN) 81 MG EC tablet Take 1 tablet (81 mg total) by mouth once daily. (Patient taking differently: Take 81 mg by mouth every evening.)  0    benzonatate (TESSALON) 100 MG capsule Take 100 mg by mouth 3 (three) times daily as needed for Cough.      bisacodyL (DULCOLAX) 5 mg EC tablet Take 5 mg by mouth 2 (two) times a day.  "     coenzyme Q10 100 mg capsule Take 1 capsule (100 mg total) by mouth 2 (two) times daily. 60 capsule 11    diclofenac sodium (VOLTAREN ARTHRITIS PAIN) 1 % Gel Apply 2 g topically daily as needed (pain).      ECHINACEA ORAL Take 750 mg by mouth 2 (two) times a day.      furosemide (LASIX) 20 MG tablet Take 1 tablet (20 mg total) by mouth once daily. (Patient taking differently: Take 20 mg by mouth every other day.)      levothyroxine (SYNTHROID) 88 MCG tablet Take 1 tablet (88 mcg total) by mouth once daily. 90 tablet 1    LIDOcaine (LIDODERM) 5 % Place 1 patch onto the skin once daily. Remove & Discard patch within 12 hours or as directed by MD (Patient taking differently: Place 1 patch onto the skin daily as needed (pain). Remove & Discard patch within 12 hours or as directed by MD) 30 patch 1    loratadine (CLARITIN) 10 mg tablet Take 10 mg by mouth once daily.      losartan (COZAAR) 25 MG tablet Take 25 mg by mouth every morning.      MAGNESIUM ORAL Take 1,000 mg by mouth once daily.      metoprolol succinate (TOPROL-XL) 25 MG 24 hr tablet Take 0.5 tablets (12.5 mg total) by mouth once daily. 15 tablet 0    montelukast (SINGULAIR) 10 mg tablet TAKE 1 TABLET BY MOUTH EVERY DAY IN THE EVENING (Patient taking differently: Take 10 mg by mouth every evening.) 90 tablet 1    multivitamin (THERAGRAN) tablet Take 1 tablet by mouth 2 (two) times a day.       naloxone (NARCAN) 4 mg/actuation Spry 1 spray by Nasal route daily as needed.      nitroGLYCERIN (NITROSTAT) 0.4 MG SL tablet Place 1 tablet (0.4 mg total) under the tongue every 5 (five) minutes as needed for Chest pain. 30 tablet 1    omeprazole (PRILOSEC) 20 MG capsule Take 1 capsule (20 mg total) by mouth once daily. (Patient taking differently: Take 20 mg by mouth every evening.) 90 capsule 3    tiZANidine (ZANAFLEX) 4 MG tablet Take 4 mg by mouth nightly as needed (muscle spasms).      traMADoL (ULTRAM) 50 mg tablet Take 50 mg by mouth 2 (two) times daily as  needed for Pain.      TURMERIC ORAL Take 200 mg by mouth once daily.      UNABLE TO FIND Take 1 capsule by mouth 2 (two) times a day. Cardio platinum      JARDIANCE 10 mg tablet Take 10 mg by mouth every morning. (Patient not taking: Reported on 1/24/2025)      UNABLE TO FIND Take 1 capsule by mouth once daily. Joint health      [DISCONTINUED] cyanocobalamin 1,000 mcg/mL injection 2cc I'm every other week 10 mL 1    [DISCONTINUED] fexofenadine (ALLEGRA) 180 MG tablet Take 180 mg by mouth once daily.      [DISCONTINUED] fluocinonide-emollient (FLUOCINONIDE-E) 0.05 % Crea Apply topically 2 (two) times daily. 60 g 1    [DISCONTINUED] fluticasone propionate (FLONASE) 50 mcg/actuation nasal spray 1 spray (50 mcg total) by Each Nostril route once daily. 9.9 mL 1    [DISCONTINUED] nystatin-triamcinolone (MYCOLOG II) cream Apply 1 g topically 4 (four) times daily.         Potential issues to be addressed PRIOR TO DISCHARGE  Patient reported not taking the following medications: (Jardiance). These medications remain on the home medication list. Please address accordingly.     Leydi Nicholson  EXT 1921       no

## 2025-01-25 ENCOUNTER — TRANSCRIPTION ENCOUNTER (OUTPATIENT)
Age: 30
End: 2025-01-25

## 2025-02-03 ENCOUNTER — TRANSCRIPTION ENCOUNTER (OUTPATIENT)
Age: 30
End: 2025-02-03

## 2025-03-05 ENCOUNTER — TRANSCRIPTION ENCOUNTER (OUTPATIENT)
Age: 30
End: 2025-03-05

## 2025-03-10 ENCOUNTER — TRANSCRIPTION ENCOUNTER (OUTPATIENT)
Age: 30
End: 2025-03-10

## 2025-03-10 RX ORDER — FLUCONAZOLE 150 MG/1
150 TABLET ORAL
Qty: 6 | Refills: 3 | Status: ACTIVE | COMMUNITY
Start: 2025-03-10 | End: 1900-01-01

## 2025-03-18 ENCOUNTER — APPOINTMENT (OUTPATIENT)
Dept: MRI IMAGING | Facility: HOSPITAL | Age: 30
End: 2025-03-18

## 2025-03-18 ENCOUNTER — OUTPATIENT (OUTPATIENT)
Dept: OUTPATIENT SERVICES | Facility: HOSPITAL | Age: 30
LOS: 1 days | End: 2025-03-18
Payer: COMMERCIAL

## 2025-03-18 DIAGNOSIS — Z98.890 OTHER SPECIFIED POSTPROCEDURAL STATES: Chronic | ICD-10-CM

## 2025-03-18 DIAGNOSIS — R90.89 OTHER ABNORMAL FINDINGS ON DIAGNOSTIC IMAGING OF CENTRAL NERVOUS SYSTEM: ICD-10-CM

## 2025-03-18 PROCEDURE — 70553 MRI BRAIN STEM W/O & W/DYE: CPT | Mod: 26

## 2025-03-18 PROCEDURE — A9585: CPT

## 2025-03-18 PROCEDURE — 70553 MRI BRAIN STEM W/O & W/DYE: CPT

## 2025-03-19 ENCOUNTER — TRANSCRIPTION ENCOUNTER (OUTPATIENT)
Age: 30
End: 2025-03-19

## 2025-03-19 ENCOUNTER — APPOINTMENT (OUTPATIENT)
Dept: MRI IMAGING | Facility: IMAGING CENTER | Age: 30
End: 2025-03-19

## 2025-03-20 ENCOUNTER — APPOINTMENT (OUTPATIENT)
Dept: OBGYN | Facility: CLINIC | Age: 30
End: 2025-03-20
Payer: COMMERCIAL

## 2025-03-20 ENCOUNTER — TRANSCRIPTION ENCOUNTER (OUTPATIENT)
Age: 30
End: 2025-03-20

## 2025-03-20 ENCOUNTER — ASOB RESULT (OUTPATIENT)
Age: 30
End: 2025-03-20

## 2025-03-20 LAB
BILIRUB UR QL STRIP: NEGATIVE
CLARITY UR: CLEAR
COLLECTION METHOD: NORMAL
GLUCOSE UR-MCNC: NEGATIVE
HCG UR QL: 0.2 EU/DL
HGB UR QL STRIP.AUTO: NORMAL
KETONES UR-MCNC: NEGATIVE
LEUKOCYTE ESTERASE UR QL STRIP: NEGATIVE
NITRITE UR QL STRIP: NEGATIVE
PH UR STRIP: 6
PROT UR STRIP-MCNC: NEGATIVE
SP GR UR STRIP: 1.01

## 2025-03-20 PROCEDURE — 76830 TRANSVAGINAL US NON-OB: CPT

## 2025-03-20 PROCEDURE — 99211 OFF/OP EST MAY X REQ PHY/QHP: CPT

## 2025-03-20 PROCEDURE — 81003 URINALYSIS AUTO W/O SCOPE: CPT | Mod: QW

## 2025-03-21 ENCOUNTER — APPOINTMENT (OUTPATIENT)
Dept: OBGYN | Facility: CLINIC | Age: 30
End: 2025-03-21
Payer: COMMERCIAL

## 2025-03-21 VITALS
SYSTOLIC BLOOD PRESSURE: 113 MMHG | HEIGHT: 65 IN | HEART RATE: 103 BPM | DIASTOLIC BLOOD PRESSURE: 74 MMHG | BODY MASS INDEX: 21.99 KG/M2 | WEIGHT: 132 LBS

## 2025-03-21 DIAGNOSIS — N76.0 ACUTE VAGINITIS: ICD-10-CM

## 2025-03-21 DIAGNOSIS — R10.30 LOWER ABDOMINAL PAIN, UNSPECIFIED: ICD-10-CM

## 2025-03-21 PROCEDURE — 99213 OFFICE O/P EST LOW 20 MIN: CPT

## 2025-03-22 LAB — BACTERIA UR CULT: NORMAL

## 2025-03-24 ENCOUNTER — TRANSCRIPTION ENCOUNTER (OUTPATIENT)
Age: 30
End: 2025-03-24

## 2025-03-26 ENCOUNTER — APPOINTMENT (OUTPATIENT)
Dept: NEUROSURGERY | Facility: CLINIC | Age: 30
End: 2025-03-26

## 2025-04-02 ENCOUNTER — NON-APPOINTMENT (OUTPATIENT)
Age: 30
End: 2025-04-02

## 2025-04-02 ENCOUNTER — APPOINTMENT (OUTPATIENT)
Dept: NEUROSURGERY | Facility: CLINIC | Age: 30
End: 2025-04-02
Payer: COMMERCIAL

## 2025-04-02 DIAGNOSIS — R90.89 OTHER ABNORMAL FINDINGS ON DIAGNOSTIC IMAGING OF CENTRAL NERVOUS SYSTEM: ICD-10-CM

## 2025-04-02 PROCEDURE — 99212 OFFICE O/P EST SF 10 MIN: CPT | Mod: 95

## 2025-07-08 ENCOUNTER — TRANSCRIPTION ENCOUNTER (OUTPATIENT)
Age: 30
End: 2025-07-08

## 2025-07-08 PROBLEM — Z12.39 SCREENING FOR BREAST CANCER: Status: ACTIVE | Noted: 2025-07-08

## 2025-07-10 ENCOUNTER — APPOINTMENT (OUTPATIENT)
Dept: OBGYN | Facility: CLINIC | Age: 30
End: 2025-07-10
Payer: COMMERCIAL

## 2025-07-10 VITALS
HEIGHT: 65 IN | BODY MASS INDEX: 22.49 KG/M2 | SYSTOLIC BLOOD PRESSURE: 111 MMHG | DIASTOLIC BLOOD PRESSURE: 76 MMHG | WEIGHT: 135 LBS | HEART RATE: 105 BPM

## 2025-07-10 PROBLEM — N60.01 BILATERAL BREAST CYSTS: Status: ACTIVE | Noted: 2025-07-10

## 2025-07-10 PROBLEM — N64.4 MASTODYNIA: Status: ACTIVE | Noted: 2025-07-10

## 2025-07-10 LAB
HCG UR QL: NEGATIVE
QUALITY CONTROL: YES

## 2025-07-10 PROCEDURE — 99212 OFFICE O/P EST SF 10 MIN: CPT

## 2025-08-11 ENCOUNTER — APPOINTMENT (OUTPATIENT)
Dept: ORTHOPEDIC SURGERY | Facility: CLINIC | Age: 30
End: 2025-08-11

## 2025-08-12 ENCOUNTER — APPOINTMENT (OUTPATIENT)
Dept: ORTHOPEDIC SURGERY | Facility: CLINIC | Age: 30
End: 2025-08-12

## 2025-08-12 DIAGNOSIS — M67.431 GANGLION, RIGHT WRIST: ICD-10-CM

## 2025-08-12 PROCEDURE — 20612 ASPIRATE/INJ GANGLION CYST: CPT | Mod: RT

## 2025-08-12 PROCEDURE — 99204 OFFICE O/P NEW MOD 45 MIN: CPT | Mod: 25

## 2025-09-08 ENCOUNTER — APPOINTMENT (OUTPATIENT)
Age: 30
End: 2025-09-08
Payer: COMMERCIAL

## 2025-09-08 ENCOUNTER — TRANSCRIPTION ENCOUNTER (OUTPATIENT)
Age: 30
End: 2025-09-08

## 2025-09-08 ENCOUNTER — NON-APPOINTMENT (OUTPATIENT)
Age: 30
End: 2025-09-08

## 2025-09-08 PROCEDURE — 92004 COMPRE OPH EXAM NEW PT 1/>: CPT

## 2025-09-08 PROCEDURE — 92083 EXTENDED VISUAL FIELD XM: CPT | Mod: NC

## 2025-09-08 PROCEDURE — 92134 CPTRZ OPH DX IMG PST SGM RTA: CPT

## 2025-09-09 ENCOUNTER — TRANSCRIPTION ENCOUNTER (OUTPATIENT)
Age: 30
End: 2025-09-09

## 2025-09-09 DIAGNOSIS — F41.9 ANXIETY DISORDER, UNSPECIFIED: ICD-10-CM

## 2025-09-09 RX ORDER — ALPRAZOLAM 0.25 MG/1
0.25 TABLET ORAL DAILY
Qty: 30 | Refills: 0 | Status: ACTIVE | COMMUNITY
Start: 2025-09-09 | End: 1900-01-01

## 2025-09-09 RX ORDER — ALPRAZOLAM 0.25 MG/1
0.25 TABLET ORAL
Qty: 30 | Refills: 0 | Status: ACTIVE | COMMUNITY
Start: 2025-09-09

## 2025-09-12 ENCOUNTER — APPOINTMENT (OUTPATIENT)
Dept: OBGYN | Facility: CLINIC | Age: 30
End: 2025-09-12
Payer: COMMERCIAL

## 2025-09-12 VITALS
HEART RATE: 101 BPM | SYSTOLIC BLOOD PRESSURE: 111 MMHG | BODY MASS INDEX: 22.33 KG/M2 | HEIGHT: 65 IN | WEIGHT: 134 LBS | DIASTOLIC BLOOD PRESSURE: 75 MMHG

## 2025-09-12 DIAGNOSIS — R10.2 PELVIC AND PERINEAL PAIN: ICD-10-CM

## 2025-09-12 DIAGNOSIS — N89.8 OTHER SPECIFIED NONINFLAMMATORY DISORDERS OF VAGINA: ICD-10-CM

## 2025-09-12 PROCEDURE — 99213 OFFICE O/P EST LOW 20 MIN: CPT

## 2025-09-12 PROCEDURE — 99459 PELVIC EXAMINATION: CPT

## 2025-09-18 ENCOUNTER — TRANSCRIPTION ENCOUNTER (OUTPATIENT)
Age: 30
End: 2025-09-18

## 2025-09-18 LAB
A VAGINAE DNA VAG QL NAA+PROBE: NORMAL
BACTERIA UR CULT: NORMAL
BVAB2 DNA VAG QL NAA+PROBE: NORMAL
C KRUSEI DNA VAG QL NAA+PROBE: NEGATIVE
C TRACH RRNA SPEC QL NAA+PROBE: NEGATIVE
CANDIDA DNA VAG QL NAA+PROBE: NEGATIVE
MEGA1 DNA VAG QL NAA+PROBE: NORMAL
N GONORRHOEA RRNA SPEC QL NAA+PROBE: NEGATIVE
T VAGINALIS RRNA SPEC QL NAA+PROBE: NEGATIVE

## 2025-09-19 ENCOUNTER — APPOINTMENT (OUTPATIENT)
Dept: MAMMOGRAPHY | Facility: CLINIC | Age: 30
End: 2025-09-19

## 2025-09-19 ENCOUNTER — RESULT REVIEW (OUTPATIENT)
Age: 30
End: 2025-09-19

## 2025-09-19 ENCOUNTER — APPOINTMENT (OUTPATIENT)
Dept: ULTRASOUND IMAGING | Facility: CLINIC | Age: 30
End: 2025-09-19
Payer: COMMERCIAL

## 2025-09-19 PROCEDURE — 76641 ULTRASOUND BREAST COMPLETE: CPT | Mod: 50
